# Patient Record
Sex: FEMALE | Race: WHITE | Employment: OTHER | URBAN - METROPOLITAN AREA
[De-identification: names, ages, dates, MRNs, and addresses within clinical notes are randomized per-mention and may not be internally consistent; named-entity substitution may affect disease eponyms.]

---

## 2017-01-03 ENCOUNTER — ONCOLOGY VISIT (OUTPATIENT)
Dept: ONCOLOGY | Facility: CLINIC | Age: 72
End: 2017-01-03
Attending: INTERNAL MEDICINE
Payer: MEDICARE

## 2017-01-03 ENCOUNTER — APPOINTMENT (OUTPATIENT)
Dept: LAB | Facility: CLINIC | Age: 72
End: 2017-01-03
Attending: INTERNAL MEDICINE
Payer: MEDICARE

## 2017-01-03 VITALS
HEART RATE: 61 BPM | OXYGEN SATURATION: 98 % | BODY MASS INDEX: 27.1 KG/M2 | WEIGHT: 159.9 LBS | RESPIRATION RATE: 20 BRPM | DIASTOLIC BLOOD PRESSURE: 59 MMHG | SYSTOLIC BLOOD PRESSURE: 106 MMHG | TEMPERATURE: 97.4 F

## 2017-01-03 DIAGNOSIS — Z85.3 PERSONAL HISTORY OF MALIGNANT NEOPLASM OF BREAST: Primary | ICD-10-CM

## 2017-01-03 DIAGNOSIS — M88.9 PAGET'S BONE DISEASE: ICD-10-CM

## 2017-01-03 DIAGNOSIS — M89.9 BONE LESION: ICD-10-CM

## 2017-01-03 DIAGNOSIS — M88.9 PAGET DISEASE OF BONE: ICD-10-CM

## 2017-01-03 LAB
ALBUMIN SERPL-MCNC: 3.9 G/DL (ref 3.4–5)
ALP SERPL-CCNC: 184 U/L (ref 40–150)
ALT SERPL W P-5'-P-CCNC: 24 U/L (ref 0–50)
ANION GAP SERPL CALCULATED.3IONS-SCNC: 6 MMOL/L (ref 3–14)
AST SERPL W P-5'-P-CCNC: 20 U/L (ref 0–45)
BASOPHILS # BLD AUTO: 0 10E9/L (ref 0–0.2)
BASOPHILS NFR BLD AUTO: 0.4 %
BILIRUB SERPL-MCNC: 0.6 MG/DL (ref 0.2–1.3)
BUN SERPL-MCNC: 10 MG/DL (ref 7–30)
CALCIUM SERPL-MCNC: 9 MG/DL (ref 8.5–10.1)
CHLORIDE SERPL-SCNC: 108 MMOL/L (ref 94–109)
CO2 SERPL-SCNC: 26 MMOL/L (ref 20–32)
CREAT SERPL-MCNC: 0.71 MG/DL (ref 0.52–1.04)
DIFFERENTIAL METHOD BLD: NORMAL
EOSINOPHIL # BLD AUTO: 0.2 10E9/L (ref 0–0.7)
EOSINOPHIL NFR BLD AUTO: 3.6 %
ERYTHROCYTE [DISTWIDTH] IN BLOOD BY AUTOMATED COUNT: 12.6 % (ref 10–15)
GFR SERPL CREATININE-BSD FRML MDRD: 81 ML/MIN/1.7M2
GLUCOSE SERPL-MCNC: 86 MG/DL (ref 70–99)
HCT VFR BLD AUTO: 41.6 % (ref 35–47)
HGB BLD-MCNC: 14 G/DL (ref 11.7–15.7)
IMM GRANULOCYTES # BLD: 0 10E9/L (ref 0–0.4)
IMM GRANULOCYTES NFR BLD: 0.2 %
LYMPHOCYTES # BLD AUTO: 1.6 10E9/L (ref 0.8–5.3)
LYMPHOCYTES NFR BLD AUTO: 31.3 %
MCH RBC QN AUTO: 29.7 PG (ref 26.5–33)
MCHC RBC AUTO-ENTMCNC: 33.7 G/DL (ref 31.5–36.5)
MCV RBC AUTO: 88 FL (ref 78–100)
MONOCYTES # BLD AUTO: 0.4 10E9/L (ref 0–1.3)
MONOCYTES NFR BLD AUTO: 7.6 %
NEUTROPHILS # BLD AUTO: 2.8 10E9/L (ref 1.6–8.3)
NEUTROPHILS NFR BLD AUTO: 56.9 %
NRBC # BLD AUTO: 0 10*3/UL
NRBC BLD AUTO-RTO: 0 /100
PLATELET # BLD AUTO: 183 10E9/L (ref 150–450)
POTASSIUM SERPL-SCNC: 4 MMOL/L (ref 3.4–5.3)
PROT SERPL-MCNC: 7.5 G/DL (ref 6.8–8.8)
RBC # BLD AUTO: 4.71 10E12/L (ref 3.8–5.2)
SODIUM SERPL-SCNC: 141 MMOL/L (ref 133–144)
WBC # BLD AUTO: 5 10E9/L (ref 4–11)

## 2017-01-03 PROCEDURE — 84165 PROTEIN E-PHORESIS SERUM: CPT | Performed by: INTERNAL MEDICINE

## 2017-01-03 PROCEDURE — 99205 OFFICE O/P NEW HI 60 MIN: CPT | Mod: ZP | Performed by: INTERNAL MEDICINE

## 2017-01-03 PROCEDURE — 36415 COLL VENOUS BLD VENIPUNCTURE: CPT

## 2017-01-03 PROCEDURE — 99212 OFFICE O/P EST SF 10 MIN: CPT | Mod: ZF

## 2017-01-03 PROCEDURE — 80053 COMPREHEN METABOLIC PANEL: CPT | Performed by: INTERNAL MEDICINE

## 2017-01-03 PROCEDURE — 82542 COL CHROMOTOGRAPHY QUAL/QUAN: CPT | Performed by: INTERNAL MEDICINE

## 2017-01-03 PROCEDURE — 00000402 ZZHCL STATISTIC TOTAL PROTEIN: Performed by: INTERNAL MEDICINE

## 2017-01-03 PROCEDURE — 85025 COMPLETE CBC W/AUTO DIFF WBC: CPT | Performed by: INTERNAL MEDICINE

## 2017-01-03 ASSESSMENT — PAIN SCALES - GENERAL: PAINLEVEL: NO PAIN (0)

## 2017-01-03 NOTE — NURSING NOTE
"Jenny Cruz is a 71 year old female who presents for:  Chief Complaint   Patient presents with     Oncology Clinic Visit     Hx of Breast Cancer, Bone Lesions        Initial Vitals:  /59 mmHg  Pulse 61  Temp(Src) 97.4  F (36.3  C) (Oral)  Resp 20  Wt 72.53 kg (159 lb 14.4 oz)  SpO2 98% Estimated body mass index is 27.1 kg/(m^2) as calculated from the following:    Height as of 12/12/16: 1.636 m (5' 4.41\").    Weight as of this encounter: 72.53 kg (159 lb 14.4 oz).. There is no height on file to calculate BSA. BP completed using cuff size: regular  No Pain (0) No LMP recorded. Patient is postmenopausal. Allergies and medications reviewed.     Medications: Medication refills not needed today.  Pharmacy name entered into [employer]: [facility] [address] [place] [address] AT [address] OF  [address]    Comments: Patient is not having any pain today.     6 minutes for nursing intake (face to face time)   Mervat Jain CMA         "

## 2017-01-03 NOTE — NURSING NOTE
Chief Complaint   Patient presents with     Oncology Clinic Visit     Hx of Breast Cancer, Bone Lesions     Blood Draw     Pt with hx of breast ca labs collected via venipuncture.

## 2017-01-03 NOTE — Clinical Note
1/3/2017       RE: Jenny Cruz   [facility] [address]     Dear Colleague,    Thank you for referring your patient, Jenny Cruz, to the [facility]. Please see a copy of my visit note below.    January 3, 2017       Jong Wray MD    Department of Medicine   [facility]   [address] [place]  [address]       RE:  Jenny Cruz       Dear Dr. Wray:       Thank you for referring Ms. Jenny Cruz to our clinic for diagnostic approaches to her bone abnormalities which were noted after an elevation of alkaline phosphatase was detected.  She also had some problems with significant pain in her distal third of her left tibia.  She did have an MRI in  showing a stress reaction of the left tibia.  She had a bone scan that lit up mildly over the stress reaction in the tibia and also in the sacroiliac joint.  She was referred for evaluation in the [facility].  She did have a chest CT at that time which showed some thyroid nodules but no evidence of metastatic cancer.       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  This finding was on a screening mammogram.  She had her treatment for the ductal carcinoma in situ performed at the [facility].  She had breast-conserving surgery followed by radiation.  We do not have the pathology, and she did not receive any adjuvant hormonal therapy presumably because this was before the publication showing efficacy of adjuvant tamoxifen in patients with estrogen receptor-positive DCIS.       PAST MENSTRUAL HISTORY:  In terms of past menstrual history, she is  4, para 3 with one miscarriage.  Age at first pregnancy was 27.  She did not nurse.  Menarche was at age 12.  Last menstrual period was at age 58.  She had previously used hormonal birth control for 1 or 2 years before .  She has had no hormone therapy, fertility medication or FAN exposure.   She has no history of breast pain.  Age at last pregnancy was 31.       PAST MEDICAL HISTORY:  Past medical history is remarkable for the breast surgery as noted for the DCIS.  She has no history of heart problems, heart attack, breathing problems, blood clots, seizures, arthritis, peptic ulcer disease, osteoporosis, or bone fractures.  She is not currently participating in a clinical trial.       REVIEW OF SYSTEMS:  On review of systems, she has no pain, no fatigue, no depression, and no anxiety. She has no weight loss.  She has an ECOG 0 performance status.  She performs all of her household chores.   She denies fever, headache, cough, chest pain, shortness of breath, hemoptysis, loss of appetite, nausea, vomiting, abdominal pain, constipation diarrhea, bone pain, back pain, muscle or joint complaints, numbness or tingling in the hands or feet, hearing loss, or depression.  The remainder of a 12-point review of systems is negative.     FAMILY HISTORY:  There is no history of breast cancer in first- or second-degree relatives.  There is no family history of ovarian cancer.  There is no family history of male breast cancer.  Her daughter had a sarcoma of her leg at age 29, received chemotherapy and other treatment and had no recurrence.  She is 41 and doing well.      HABITS:  She has no history of tobacco or significant consumption of alcoholic beverages.      ALLERGIES:  She has no allergy to seafood, iodine or contrast dye.  She does not take aspirin.      SOCIAL:  She is .  She works as a drug and alcohol counselor.       PHYSICAL EXAM:    Vital Signs:  Blood pressure 106/59, temperature 97.4, pulse 61, respirations 20, O2 sat 98% on room air and weight 72 kg.   General:  Ms. Cruz appeared generally well.    HEENT:  She has no alopecia.  Examination of the oropharynx is without lesions.    Lymph:  There is no palpable cervical, supraclavicular, subclavicular or axillary lymphadenopathy.    Breasts:   Examination of the right breast reveals a well-healed incision at the 1 o'clock position    3 fingerbreadths above the nipple-areolar complex.  This is well-healed without erythema or masses.  There are no masses in the right breast.  Right axillary incision is well-healed without erythema or masses.  The left breast is without masses.   Lungs:  Clear to percussion and auscultation.   Heart:  There is a regular rate and rhythm, S1, S2.    Abdomen:  Soft, nontender, without hepatosplenomegaly.   Extremities:  Without edema.    Psychiatric:  Mood and affect were normal.       LABORATORY DATA: CBC and CMP were within normal limits except for an elevated alkaline phosphatase of 184.  She did have a screening mammogram performed on 12/23/2016 which was    BI-RADS 2, scattered fibroglandular densities, and breast-conserving changes on the right.  She did also have a renal ultrasound which showed a 1.6-cm echogenic focus in the inferior pole of the left kidney which was compatible with angiomyolipoma, not significantly changed compared to prior exams, and a 1-cm, mildly complex cyst in the superior aspect of the right kidney, not significantly changed compared with ultrasound from 10/12/2015.       ASSESSMENT AND PLAN:  1.  I discussed with Ms. Cruz that the question we are addressing today is whether she could potentially have metastatic breast cancer.  This is in light of a prior history of ductal carcinoma in situ and the discovery of diffuse bone abnormalities throughout the pelvis.  There is sclerosis of the L5 vertebral body, sacrum and ilium correlating with increased uptake on a bone scan seen on 04/29/2016.  There is mild sclerosis throughout the visualized spine, more prominently in the lower lumbar spine at L2, L3 and L4 which could represent degenerative changes.  There is likely multilevel neural foraminal narrowing of the lumbar spine, and degenerative changes are seen throughout the remainder of the  visualized spine with some mild thoracolumbar scoliosis.  She also has had a bone scan performed on 04/27/2016 which shows unchanged uptake in the sacrum and right ilium, possible tiny residual focus of uptake in the proximal left femur in correlation with the previous studies, and laboratory analysis that was felt to be most consistent with Paget's disease.   2.  Discussion of differential diagnosis. Paget's disease most likely based on the imaging. labs and history.   I discussed with Ms. Cruz that it is very unlikely that she could have metastatic breast cancer, and I discussed with her that DCIS is not a form of invasive cancer and is not a cancer at all.  I discussed that, nonetheless, there are rare cases where occult breast primary cancers can result in distant metastatic disease.  I discussed that a simple way to address this would be to perform bilateral bone marrow biopsies and we could assess for cytokeratin-positive cells.  I think that if the bilateral bone marrow biopsies are negative, the process in the bones appears fairly diffuse and it would be very unlikely that she has metastatic breast cancer.    3.  Review of pathology of the right breast DCIS.  I did request that the slides be sent from [facility] for evaluation.    4.  I discussed the rationale, risks and benefits of the bone marrow biopsy.  All of her questions were answered.  5.  Followup will be in 2 weeks.  All of her questions were answered.       Thank you, Dr. Wray, for referring Jenny Cruz to our clinic and allowing us to participate in her care.       Jong Vargas MD,    [facility]   ADDENDUM:  SPEP normal.   I spent 60 minutes with the patient more than 50% of which was in counseling and coordination of care.

## 2017-01-03 NOTE — MR AVS SNAPSHOT
After Visit Summary   1/3/2017    Jenny Cruz    MRN: [id number]           Patient Information     Date Of Birth          1945        Visit Information        Provider Department      1/3/2017 8:30 AM Jong Vargas MD [facility]        Today's Diagnoses     Personal history of malignant neoplasm of breast    -  1     Paget's bone disease         Bone lesion            Follow-ups after your visit        Follow-up notes from your care team     Return in about 2 weeks (around 1/17/2017).      Your next 10 appointments already scheduled     Jan 03, 2017 10:15 AM   [facility] Draw with  [facility]   [facility] Lab Draw ([facility])    [address] [place] [id number] [phone]            Jan 11, 2017  9:40 AM   (Arrive by 9:25 AM)   BONE MARROW BIOPSY with Kiersten Spencer PA-C, [facility] BONE MARROW BIOPSY   [facility] ([facility])    [address] [place] [id number] [phone]           You may eat and drink prior to the procedure. You will have labs drawn prior to the procedure. You will be awake for the procedure. You will need a  to take you home. Please talk to your doctor about when to stop taking blood thinning medications. Please call our triage nurses with any questions that you may have at [phone].            Jan 17, 2017  9:00 AM   (Arrive by 8:45 AM)   Return Visit with Jong Vargas MD   [facility] ([facility])    [address] [place] [address] [id number] [phone]            Apr 21, 2017  9:20 AM   (Arrive by 8:50 AM)   RETURN ENDOCRINE with Raven Whipple MD   [facility])    [address] [place] [id number] [phone]              Future  "tests that were ordered for you today     Open Standing Orders        Priority Remaining Interval Expires Ordered    CBC with platelets differential Routine /52  1/3/2018 1/3/2017    Comprehensive metabolic panel Routine 52/52  1/3/2018 1/3/2017          Open Future Orders        Priority Expected Expires Ordered    Protein electrophoresis Routine  1/3/2018 1/3/2017            Who to contact     If you have questions or need follow up information about today's clinic visit or your schedule please contact [facility] directly at [phone].  Normal or non-critical lab and imaging results will be communicated to you by [employer], letter or phone within 4 business days after the clinic has received the results. If you do not hear from us within 7 days, please contact the clinic through [facility] [employer] or phone. If you have a critical or abnormal lab result, we will notify you by phone as soon as possible.  Submit refill requests through dotloop or call your pharmacy and they will forward the refill request to us. Please allow 3 business days for your refill to be completed.          Additional Information About Your Visit        [employer] Information     dotloop lets you send messages to your doctor, view your test results, renew your prescriptions, schedule appointments and more. To sign up, go to [email or web address] . Click on \"Log in\" on the left side of the screen, which will take you to the Welcome page. Then click on \"Sign up Now\" on the right side of the page.     You will be asked to enter the access code listed below, as well as some personal information. Please follow the directions to create your username and password.     Your access code is: [email or web address]  Expires: 3/5/2017  6:31 AM     Your access code will  in 90 days. If you need help or a new code, please call your [facility] or [phone].        Care EveryWhere ID     This is your Care EveryWhere ID. This could be used by " other organizations to access your [place] medical records  [id number]        Your Vitals Were     Pulse Temperature Respirations Pulse Oximetry          61 97.4  F (36.3  C) (Oral) 20 98%         Blood Pressure from Last 3 Encounters:   01/03/17 106/59   12/20/16 135/80   09/06/16 134/90    Weight from Last 3 Encounters:   01/03/17 72.53 kg (159 lb 14.4 oz)   12/20/16 71.895 kg (158 lb 8 oz)   12/12/16 71.94 kg (158 lb 9.6 oz)              We Performed the Following     PTH  Intact ([facility])     PTH Related Peptide Test        Primary Care Provider Office Phone # Fax #    Jong Wray [phone]       [address] [address]        Thank you!     Thank you for choosing [facility]  for your care. Our goal is always to provide you with excellent care. Hearing back from our patients is one way we can continue to improve our services. Please take a few minutes to complete the written survey that you may receive in the mail after your visit with us. Thank you!             Your Updated Medication List - Protect others around you: Learn how to safely use, store and throw away your medicines at www.disposemymeds.org.          This list is accurate as of: 1/3/17 10:07 AM.  Always use your most recent med list.                   Brand Name Dispense Instructions for use    CALCIUM-VITAMIN D PO          VITAMIN B COMPLEX PO          VITAMIN E COMPLEX PO

## 2017-01-04 LAB
ALBUMIN SERPL ELPH-MCNC: 4.3 G/DL (ref 3.7–5.1)
ALPHA1 GLOB SERPL ELPH-MCNC: 0.3 G/DL (ref 0.2–0.4)
ALPHA2 GLOB SERPL ELPH-MCNC: 0.7 G/DL (ref 0.5–0.9)
B-GLOBULIN SERPL ELPH-MCNC: 0.9 G/DL (ref 0.6–1)
GAMMA GLOB SERPL ELPH-MCNC: 1.1 G/DL (ref 0.7–1.6)
M PROTEIN SERPL ELPH-MCNC: 0 G/DL
PROT PATTERN SERPL ELPH-IMP: NORMAL

## 2017-01-04 NOTE — PROGRESS NOTES
January 3, 2017       Jong Wray MD    Department of Medicine   [facility]   [address] [place]  [address]       RE:  Jenny Cruz       Dear Dr. Wray:       Thank you for referring Ms. Jenny Cruz to our clinic for diagnostic approaches to her bone abnormalities which were noted after an elevation of alkaline phosphatase was detected.  She also had some problems with significant pain in her distal third of her left tibia.  She did have an MRI in  showing a stress reaction of the left tibia.  She had a bone scan that lit up mildly over the stress reaction in the tibia and also in the sacroiliac joint.  She was referred for evaluation in the [facility].  She did have a chest CT at that time which showed some thyroid nodules but no evidence of metastatic cancer.       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  This finding was on a screening mammogram.  She had her treatment for the ductal carcinoma in situ performed at the [facility].  She had breast-conserving surgery followed by radiation.  We do not have the pathology, and she did not receive any adjuvant hormonal therapy presumably because this was before the publication showing efficacy of adjuvant tamoxifen in patients with estrogen receptor-positive DCIS.       PAST MENSTRUAL HISTORY:  In terms of past menstrual history, she is  4, para 3 with one miscarriage.  Age at first pregnancy was 27.  She did not nurse.  Menarche was at age 12.  Last menstrual period was at age 58.  She had previously used hormonal birth control for 1 or 2 years before .  She has had no hormone therapy, fertility medication or FAN exposure.  She has no history of breast pain.  Age at last pregnancy was 31.       PAST MEDICAL HISTORY:  Past medical history is remarkable for the breast surgery as noted for the DCIS.  She has no history of heart problems, heart attack,  breathing problems, blood clots, seizures, arthritis, peptic ulcer disease, osteoporosis, or bone fractures.  She is not currently participating in a clinical trial.       REVIEW OF SYSTEMS:  On review of systems, she has no pain, no fatigue, no depression, and no anxiety. She has no weight loss.  She has an ECOG 0 performance status.  She performs all of her household chores.   She denies fever, headache, cough, chest pain, shortness of breath, hemoptysis, loss of appetite, nausea, vomiting, abdominal pain, constipation diarrhea, bone pain, back pain, muscle or joint complaints, numbness or tingling in the hands or feet, hearing loss, or depression.  The remainder of a 12-point review of systems is negative.     FAMILY HISTORY:  There is no history of breast cancer in first- or second-degree relatives.  There is no family history of ovarian cancer.  There is no family history of male breast cancer.  Her daughter had a sarcoma of her leg at age 29, received chemotherapy and other treatment and had no recurrence.  She is 41 and doing well.      HABITS:  She has no history of tobacco or significant consumption of alcoholic beverages.      ALLERGIES:  She has no allergy to seafood, iodine or contrast dye.  She does not take aspirin.      SOCIAL:  She is .  She works as a drug and alcohol counselor.       PHYSICAL EXAM:    Vital Signs:  Blood pressure 106/59, temperature 97.4, pulse 61, respirations 20, O2 sat 98% on room air and weight 72 kg.   General:  Ms. Cruz appeared generally well.    HEENT:  She has no alopecia.  Examination of the oropharynx is without lesions.    Lymph:  There is no palpable cervical, supraclavicular, subclavicular or axillary lymphadenopathy.    Breasts:  Examination of the right breast reveals a well-healed incision at the 1 o'clock position    3 fingerbreadths above the nipple-areolar complex.  This is well-healed without erythema or masses.  There are no masses in the right  breast.  Right axillary incision is well-healed without erythema or masses.  The left breast is without masses.   Lungs:  Clear to percussion and auscultation.   Heart:  There is a regular rate and rhythm, S1, S2.    Abdomen:  Soft, nontender, without hepatosplenomegaly.   Extremities:  Without edema.    Psychiatric:  Mood and affect were normal.       LABORATORY DATA: CBC and CMP were within normal limits except for an elevated alkaline phosphatase of 184.  She did have a screening mammogram performed on 12/23/2016 which was    BI-RADS 2, scattered fibroglandular densities, and breast-conserving changes on the right.  She did also have a renal ultrasound which showed a 1.6-cm echogenic focus in the inferior pole of the left kidney which was compatible with angiomyolipoma, not significantly changed compared to prior exams, and a 1-cm, mildly complex cyst in the superior aspect of the right kidney, not significantly changed compared with ultrasound from 10/12/2015.       ASSESSMENT AND PLAN:  1.  I discussed with Ms. Cruz that the question we are addressing today is whether she could potentially have metastatic breast cancer.  This is in light of a prior history of ductal carcinoma in situ and the discovery of diffuse bone abnormalities throughout the pelvis.  There is sclerosis of the L5 vertebral body, sacrum and ilium correlating with increased uptake on a bone scan seen on 04/29/2016.  There is mild sclerosis throughout the visualized spine, more prominently in the lower lumbar spine at L2, L3 and L4 which could represent degenerative changes.  There is likely multilevel neural foraminal narrowing of the lumbar spine, and degenerative changes are seen throughout the remainder of the visualized spine with some mild thoracolumbar scoliosis.  She also has had a bone scan performed on 04/27/2016 which shows unchanged uptake in the sacrum and right ilium, possible tiny residual focus of uptake in the proximal left  femur in correlation with the previous studies, and laboratory analysis that was felt to be most consistent with Paget's disease.   2.  Discussion of differential diagnosis. Paget's disease most likely based on the imaging. labs and history.   I discussed with Ms. Shen that it is very unlikely that she could have metastatic breast cancer, and I discussed with her that DCIS is not a form of invasive cancer and is not a cancer at all.  I discussed that, nonetheless, there are rare cases where occult breast primary cancers can result in distant metastatic disease.  I discussed that a simple way to address this would be to perform bilateral bone marrow biopsies and we could assess for cytokeratin-positive cells.  I think that if the bilateral bone marrow biopsies are negative, the process in the bones appears fairly diffuse and it would be very unlikely that she has metastatic breast cancer.    3.  Review of pathology of the right breast DCIS.  I did request that the slides be sent from [facility] for evaluation.    4.  I discussed the rationale, risks and benefits of the bone marrow biopsy.  All of her questions were answered.  5.  Followup will be in 2 weeks.  All of her questions were answered.       Thank you, Dr. Wray, for referring Kiki Shen to our clinic and allowing us to participate in her care.       Miguelangel Vargas MD,    [facility]   ADDENDUM:  SPEP normal.   ADDENDUM2:  Bone marrow shows no evidence of breast cancer and is consistent with Paget disease. Patient was called by Shannon Gill on 1-13.    ADDENDUM3:  Review of [facility] pathology reported 4-19-17 confirms that DCIS is ER and SC negative.    Name: KIKI SHEN   MR#: [id number]   Specimen #: [id number]   Collected: 1/11/2017   Received: 1/11/2017   Reported: 1/12/2017 17:26   Ordering Phy(s): MIGUELANGEL VARGAS   Additional Phy(s): KAYODE TIRADO     For improved result  formatting, select 'View Enhanced Report Format'   under Linked Documents section.     TEST(S):   A: Right Bone Marrow Biopsy/Aspiration   B: Left Bone Marrow Biopsy/Aspiration     FINAL DIAGNOSIS:   Bone marrow, posterior iliac crest, bilateral decalcified trephine   biopsy and touch imprint; bilateral direct aspirate smear, and   concentrated aspirate smear; left particle crush; and peripheral blood   smear:       - Normocellular marrow (20-30%) with trilineage hematopoiesis     - No evidence of metastatic carcinoma     - Bone changes consistent with Paget's disease     - Peripheral blood showing normal hemogram and differential     COMMENT:   There are rare lymphoid aggregates present, consistent with reactive   lymphoid aggregates based on morphology and immunophenotype.     Iron stores appear decreased based on iron stain. Clinical correlation   is recommended and correlation with peripheral blood iron   studies/ferritin may be indicated.     Flow cytometry ([id number]) identified polytypic B cells and no aberrant   immunophenotype on T cells.     I have personally reviewed all specimens and/or slides, including the   listed special stains, and used them with my medical judgment to   determine the final diagnosis.     Electronically signed out by:     Ese Celestin M.D., [facility]     Technical testing/processing performed at [place], [place]     CLINICAL HISTORY:   From [facility] electronic medial record; 71 year old female with a history of   ductal carcinoma in situ (2006). Bone abnormalities seen on imaging in   2013 and recently (not significantly changed). This bone marrow biopsy   is performed to rule out metastatic breast cancer.     REPORT:   Procedure/Gross Description   Aspirate(s) and trephine(s) procured by CATHERINE Posadas     Specimen sent for Special Studies:        Flow Cytometry: Bilateral aspirate        Cytogenetics: Bilateral  aspirate        Molecular Diagnostics: Left aspirate     Biopsy aspiration site: [@left posterior iliac crest, right posterior   iliac crest, bilateral aspirate, sternal aspirate, aspirate only]               RIGHT    LEFT     (Reference Range)        Amount of aspirate              2.5       3.0      mL        Fat and P.V. cell layer           trace    3.0      %     (1 - 3)        Particles                        0.0       5.0      %        Myeloid-erythroid layer          1.0       6.0      %     (5 - 8)          Clot Section: No     Trephine biopsy site: Bilateral posterior iliac crest     Designated left (B) posterior iliac crest is 1 cylinder of gritty   tissue, labeled with the patient's name and hospital number, having a   diameter of 2 mm and a length of 16 mm; entirely submitted in one   cassette; acetic zinc formalin fixed, decalcified, processed, and   stained for hematoxylin and eosin per laboratory protocol.     Designated right (A) posterior iliac crest is 1 cylinder of gritty   tissue, labeled with the patient's name and hospital number, having a   diameter of 2 mm and a length of 6 mm; entirely submitted in one   cassette; acetic zinc formalin fixed, decalcified, processed, and   stained for hematoxylin and eosin per laboratory protocol.     MICROSCOPIC DESCRIPTION:   PERIPHERAL BLOOD DATA (Date: January 11, 2017)   Patient Value (Reference Range >18 year old female)   7.7 WBC (4.0-11.0 x 10*9/L)   4.50 RBC (3.8-5.2 x 10*12/L)   13.2 HGB (11.7-15.7 g/dL)   87 MCV (78-100fL)   33.8 MCHC (31.5-36.5 g/dL)   12.7 RDW (10.0-15.0 %)   178 PLT (150-450 x 10*9/L)     PERIPHERAL BLOOD DIFFERENTIAL   (automated differential)   (Reference ranges >18 year old)     Percent   72.8 Neutrophils, segmented and bands   18.0 Lymphocytes   6.4 Monocytes   2.1 Eosinophils   0.4 Basophils   0.3 Immature granulocytes     Absolute   5.6 Neutrophils, segmented and bands (1.6 - 8.3 x 10*9/L)   1.4 Lymphocytes (0.8 - 5.3 x  10*9/L)   0.5 Monocytes (0 -1.3 x 10*9/L)   0.2 Eosinophils (0 - 0.7 x 10*9/L)   0.0 Basophils (0 - 0.2 x 10*9/L)   0.0 Immature granulocytes (0-0.4 x 10*9/L)     The red cells appear normochromic.  Poikilocytosis is minimal.   Polychromasia is not increased. Rouleaux formation is not increased. The   morphology of the platelets is normal.     Bone marrow aspirates and touch imprints of bone biopsies are reviewed.     BONE MARROW DIFFERENTIAL (500 cells on left direct smears):     Percent  Cell (reference range)   0.4 Blasts (0 - 1)   3.2 Neutrophil promyelocytes (2 - 4)   44.2 Neutrophils and precursors (54 - 63)   23.2 Erythroid precursors (18 - 24)   3.6 Monocytes (1 - 1.5)   3.2 Eosinophils (1 - 3)   0.0 Basophils (0 - 1)   19.6 Lymphocytes (8 - 12)   2.6 Plasma cells  (0 - 1.5)     Neutrophil maturation is complete. Erythroid maturation is complete.   Megakaryocytes are present.     IRON STAINS: Dacie iron stain on concentrate smears: Iron stains show 1%   sideroblasts.  No ring sideroblasts seen on scanning.     Prussian blue stain on particle crush:  Iron stores are decreased. .     TREPHINE SECTIONS:   The marrow cellularity is variable, ranging from <5% up to 50%, overall   20-30%. The cellular composition reflects the aspirate differential.   Megakaryocytes are present. There are rare small interstitial and   juxtatrabecular lymphoid aggregates identified on the left side core on   the deepest levels. There are bone changes consistent with Paget's   disease (particularly on the right sided biopsy).     IMMUNOHISTOCHEMICAL STAINS:   Immunohistochemical stains for CD3, CD20, and cytokeratin AE1/AE3 were   performed on the left sided core. Immunohistochemical stain for   cytokeratin AE1/AE3 was performed on the right sided core.     Cytokeratin AE1/AE3 is negative.     CD3 highlights T cells and CD20 highlights B cells. The lymphoid   aggregates are a mixture of CD3 and CD20 positive cells.     Note:  These  immunohistochemical stains are deemed medically necessary.   Some of the antigens may also be evaluated by flow cytometry.   Concurrent evaluation by immunohistochemistry on clot and/or trephine   sections is indicated in this case in order to correlate immunophenotype   with cell morphology and determine extent of involvement, spatial   pattern, and focality of potential disease distribution.     CPT Codes:   A: 27654-QEHO.T, 23449-MRXEU, 45457-AWWN, HBM, 95331-MGZZ, 09588-TKJLZ,   77950-JSLL.T, 32819-QLCWE, 80454-RVQ   B: 50829-IMIWH.T, 46189-BVRR, 99104-HMGTMC, HBM, 83401-NOCG,   90289-NNGVK, 55260-ANEM.T, 49721-ODSGR, 16791-YRYGD, 72862-LHRDW,   54538-JEN, 01549-ZUJ, 41777-OEN     TESTING LAB LOCATION:   [facility], [address]   [address] [phone]     COLLECTION SITE:   Client:  [facility]   Location:  [facility] ()           Patient Name: KIKI SHEN   MR#: [id number]   Specimen #: [id number]   Collected: 1/10/2017   Received: 1/10/2017   Reported: 4/18/2017 20:25   Ordering Phy(s): KAYODE TIRADO   Additional Phy(s): [facility]     For improved result formatting, select 'View Enhanced Report Format'   under Linked Documents section.     TEST(S):   A: 3 Slides, case #[id number]   B: 4 Slides, case #[id number]     FINAL DIAGNOSIS:   I. CASE FROM [place], [place] ([id number], OBTAINED   07/14/2016):   Right breast, 1 o'clock, core biopsy:   - Ductal carcinoma in situ (DCIS), nuclear grade 3 ,solid type, with   lobular involvement and comedo necrosis   - Calcifications associated with DCIS   - DCIS is estrogen receptor negative (<1%, weak intensity) and   progesterone receptor negative (< 1%, moderate intensity) by   immunohistochemistry   - See comment     II. CASE FROM [place], [place] ([id number], OBTAINED    07/20/2006):   A. Lymph node, right axillary, sentinel, excision:   - One benign lymph node (0/1)   - See comment     B. Right breast, wide local excision:   - Ductal carcinoma in situ (DCIS), nuclear grade 3 ,solid and cribriform   types, with lobular involvement and comedo necrosis   - Calcifications associated with DCIS and benign acini   - See comment     COMMENT:   I. Right breast core biopsy (obtained 7/14/2006): One H&E slide,   estrogen receptor (ER) and progesterone receptor (PgR) immunostains are   provided for review.  No immunostain controls are provided for review.   The ER and PgR results are listed in the final diagnosis above.  The ER   immunostain shows staining in comedo necrosis, and cytoplasmic staining   in some of the DCIS, which are not interpreted as true positive tumor   cells.     II. Right breast wide excision and right axillary sentinel lymph node   (obtained 9/20/2006):   A. [place] lymph node: One H&E level and one keratin immunostain are   provided for review.     B. Right breast: One H&E slide (B25) is provided for review.  It shows   DCIS, which spans 7 mm.  According to the outside report, DCIS measures   1.4 x 1.0 x 0.8 cm, and the margins are uninvolved by DCIS.  The   pathologic stage is pTisN0(i-)sn.     I have personally reviewed all specimens and or slides, including the   listed special stains, and used them with my medical judgement to   determine the final diagnosis.     Electronically signed out by:     Sowmya Souza M.D., [facility]     CLINICAL HISTORY:   The patient is a 71-year-old woman.     GROSS:   Received from [facility] in [place], [place] are 3 stained slides labeled   [id number] (obtained 07/14/2006), 4 stained slides labeled [id number]   (obtained 07/20/2006), and copies of the referring pathologist's reports   with patient identifying information.  All slides are returned.     CPT Codes:   A: 40936-SAK6   B: 67528-IWA6     TESTING LAB LOCATION:    [facility]   [address]   [place]   [address]   [phone]     COLLECTION SITE:   Client:  [facility]   Location:  TAB (B)     I spent 60 minutes with the patient more than 50% of which was in counseling and coordination of care.

## 2017-01-06 ENCOUNTER — CARE COORDINATION (OUTPATIENT)
Dept: ONCOLOGY | Facility: CLINIC | Age: 72
End: 2017-01-06

## 2017-01-06 LAB — PTH RELATED PROT SERPL-SCNC: 2.6 PMOL/L

## 2017-01-06 NOTE — PROGRESS NOTES
Received by [employer] pathology slides from [facility] from right breast excision and right axillary LN done on 7/20/06 Accession #[id number] that was brought to the Pathology Department for review. Shannon Gill RN, BSN

## 2017-01-10 PROCEDURE — 00000346 ZZHCL STATISTIC REVIEW OUTSIDE SLIDES TC 88321: Performed by: PHYSICIAN ASSISTANT

## 2017-01-11 ENCOUNTER — OFFICE VISIT (OUTPATIENT)
Dept: ONCOLOGY | Facility: CLINIC | Age: 72
End: 2017-01-11
Attending: PHYSICIAN ASSISTANT
Payer: MEDICARE

## 2017-01-11 VITALS
WEIGHT: 156.75 LBS | BODY MASS INDEX: 26.56 KG/M2 | SYSTOLIC BLOOD PRESSURE: 120 MMHG | HEART RATE: 60 BPM | TEMPERATURE: 98.7 F | OXYGEN SATURATION: 94 % | RESPIRATION RATE: 16 BRPM | DIASTOLIC BLOOD PRESSURE: 73 MMHG

## 2017-01-11 DIAGNOSIS — Z85.3 PERSONAL HISTORY OF MALIGNANT NEOPLASM OF BREAST: ICD-10-CM

## 2017-01-11 DIAGNOSIS — M89.9 BONE LESION: Primary | ICD-10-CM

## 2017-01-11 LAB
ALBUMIN SERPL-MCNC: 3.6 G/DL (ref 3.4–5)
ALP SERPL-CCNC: 177 U/L (ref 40–150)
ALT SERPL W P-5'-P-CCNC: 18 U/L (ref 0–50)
ANION GAP SERPL CALCULATED.3IONS-SCNC: 8 MMOL/L (ref 3–14)
AST SERPL W P-5'-P-CCNC: 20 U/L (ref 0–45)
BASOPHILS # BLD AUTO: 0 10E9/L (ref 0–0.2)
BASOPHILS NFR BLD AUTO: 0.4 %
BILIRUB SERPL-MCNC: 0.6 MG/DL (ref 0.2–1.3)
BUN SERPL-MCNC: 15 MG/DL (ref 7–30)
CALCIUM SERPL-MCNC: 9.1 MG/DL (ref 8.5–10.1)
CHLORIDE SERPL-SCNC: 107 MMOL/L (ref 94–109)
CO2 SERPL-SCNC: 25 MMOL/L (ref 20–32)
CREAT SERPL-MCNC: 0.59 MG/DL (ref 0.52–1.04)
DIFFERENTIAL METHOD BLD: NORMAL
EOSINOPHIL # BLD AUTO: 0.2 10E9/L (ref 0–0.7)
EOSINOPHIL NFR BLD AUTO: 2.1 %
ERYTHROCYTE [DISTWIDTH] IN BLOOD BY AUTOMATED COUNT: 12.7 % (ref 10–15)
GFR SERPL CREATININE-BSD FRML MDRD: ABNORMAL ML/MIN/1.7M2
GLUCOSE SERPL-MCNC: 93 MG/DL (ref 70–99)
HCT VFR BLD AUTO: 39 % (ref 35–47)
HGB BLD-MCNC: 13.2 G/DL (ref 11.7–15.7)
IMM GRANULOCYTES # BLD: 0 10E9/L (ref 0–0.4)
IMM GRANULOCYTES NFR BLD: 0.3 %
LYMPHOCYTES # BLD AUTO: 1.4 10E9/L (ref 0.8–5.3)
LYMPHOCYTES NFR BLD AUTO: 18 %
MCH RBC QN AUTO: 29.3 PG (ref 26.5–33)
MCHC RBC AUTO-ENTMCNC: 33.8 G/DL (ref 31.5–36.5)
MCV RBC AUTO: 87 FL (ref 78–100)
MONOCYTES # BLD AUTO: 0.5 10E9/L (ref 0–1.3)
MONOCYTES NFR BLD AUTO: 6.4 %
NEUTROPHILS # BLD AUTO: 5.6 10E9/L (ref 1.6–8.3)
NEUTROPHILS NFR BLD AUTO: 72.8 %
NRBC # BLD AUTO: 0 10*3/UL
NRBC BLD AUTO-RTO: 0 /100
PLATELET # BLD AUTO: 178 10E9/L (ref 150–450)
POTASSIUM SERPL-SCNC: 4.1 MMOL/L (ref 3.4–5.3)
PROT SERPL-MCNC: 7.4 G/DL (ref 6.8–8.8)
RBC # BLD AUTO: 4.5 10E12/L (ref 3.8–5.2)
SODIUM SERPL-SCNC: 140 MMOL/L (ref 133–144)
WBC # BLD AUTO: 7.7 10E9/L (ref 4–11)

## 2017-01-11 PROCEDURE — 40000424 ZZHCL STATISTIC BONE MARROW CORE PERF TC 38221: Performed by: INTERNAL MEDICINE

## 2017-01-11 PROCEDURE — 88305 TISSUE EXAM BY PATHOLOGIST: CPT | Performed by: INTERNAL MEDICINE

## 2017-01-11 PROCEDURE — 88341 IMHCHEM/IMCYTCHM EA ADD ANTB: CPT | Performed by: INTERNAL MEDICINE

## 2017-01-11 PROCEDURE — 88311 DECALCIFY TISSUE: CPT | Mod: 91 | Performed by: INTERNAL MEDICINE

## 2017-01-11 PROCEDURE — 85025 COMPLETE CBC W/AUTO DIFF WBC: CPT | Performed by: INTERNAL MEDICINE

## 2017-01-11 PROCEDURE — 40000795 ZZHCL STATISTIC DNA PROCESS AND HOLD: Performed by: PHYSICIAN ASSISTANT

## 2017-01-11 PROCEDURE — 40000611 ZZHCL STATISTIC MORPHOLOGY W/INTERP HEMEPATH TC 85060: Performed by: INTERNAL MEDICINE

## 2017-01-11 PROCEDURE — 38221 DX BONE MARROW BIOPSIES: CPT | Mod: 50,ZF | Performed by: PHYSICIAN ASSISTANT

## 2017-01-11 PROCEDURE — 88185 FLOWCYTOMETRY/TC ADD-ON: CPT | Performed by: INTERNAL MEDICINE

## 2017-01-11 PROCEDURE — 88275 CYTOGENETICS 100-300: CPT | Performed by: PHYSICIAN ASSISTANT

## 2017-01-11 PROCEDURE — 88313 SPECIAL STAINS GROUP 2: CPT | Performed by: INTERNAL MEDICINE

## 2017-01-11 PROCEDURE — 25000125 ZZHC RX 250: Mod: ZF | Performed by: PHYSICIAN ASSISTANT

## 2017-01-11 PROCEDURE — 88161 CYTOPATH SMEAR OTHER SOURCE: CPT | Performed by: INTERNAL MEDICINE

## 2017-01-11 PROCEDURE — 88184 FLOWCYTOMETRY/ TC 1 MARKER: CPT | Performed by: INTERNAL MEDICINE

## 2017-01-11 PROCEDURE — 88237 TISSUE CULTURE BONE MARROW: CPT | Mod: 91 | Performed by: PHYSICIAN ASSISTANT

## 2017-01-11 PROCEDURE — 00000161 ZZHCL STATISTIC H-SPHEME PROCESS B/S: Performed by: INTERNAL MEDICINE

## 2017-01-11 PROCEDURE — 40000951 ZZHCL STATISTIC BONE MARROW INTERP TC 85097: Performed by: INTERNAL MEDICINE

## 2017-01-11 PROCEDURE — G0364 BONE MARROW ASPIRATE &BIOPSY: HCPCS | Mod: ZF | Performed by: PHYSICIAN ASSISTANT

## 2017-01-11 PROCEDURE — 88342 IMHCHEM/IMCYTCHM 1ST ANTB: CPT | Performed by: INTERNAL MEDICINE

## 2017-01-11 PROCEDURE — 40000425 ZZHCL STATISTIC ADDL BM COR PERF TC 38221: Performed by: INTERNAL MEDICINE

## 2017-01-11 PROCEDURE — 80053 COMPREHEN METABOLIC PANEL: CPT | Performed by: INTERNAL MEDICINE

## 2017-01-11 PROCEDURE — 00000058 ZZHCL STATISTIC BONE MARROW ASP PERF TC 38220: Performed by: INTERNAL MEDICINE

## 2017-01-11 PROCEDURE — 88271 CYTOGENETICS DNA PROBE: CPT | Performed by: PHYSICIAN ASSISTANT

## 2017-01-11 RX ADMIN — MIDAZOLAM 0.5 MG: 1 INJECTION INTRAMUSCULAR; INTRAVENOUS at 10:25

## 2017-01-11 ASSESSMENT — PAIN SCALES - GENERAL: PAINLEVEL: NO PAIN (0)

## 2017-01-11 NOTE — NURSING NOTE
BMT Teaching Flowsheet   Teaching Topic: post biopsy instructions    Person(s) involved in teaching: Patient  Motivation Level  Asks Questions: Yes  Eager to Learn: Yes  Cooperative: Yes  Receptive (willing/able to accept information): Yes    Patient demonstrates understanding of the following:   - Reason for the appointment, diagnosis and treatment plan: Yes  - Knowledge of proper use of medications and conditions for which they are ordered (with special attention to potential side effects or drug interactions): Yes  - Which situations necessitate calling provider and whom to contact: Yes    Teaching concerns addressed: reviewed activity restrictions if received premeds, potential for bleeding and actions to take if develops any of the issues below    Proper use and care of (medical equipment, care aids, etc.) Yes  Pain management techniques: Yes  Patient instructed on hand hygiene: Yes  How and/when to access community resources: Yes    Infection Control:  Patient demonstrates understanding of the following:   Surgical procedure site care taught NA  Signs and symptoms of infection taught Yes  Wound care taught Yes  Central venous catheter care taught NA    Instructional Materials Used/Given: verbal, print out of post biopsy instructions.     Time spent with patient: 5 minutes.    Specific Concerns: NA

## 2017-01-11 NOTE — Clinical Note
1/11/2017       RE: Jenny Cruz  [address]  [place] [address]     Dear Colleague,    Thank you for referring your patient, Jenny Cruz, to the [facility]. Please see a copy of my visit note below.    No notes on file    Again, thank you for allowing me to participate in the care of your patient.      Sincerely,    Kiersten Spencer PA-C

## 2017-01-11 NOTE — NURSING NOTE
Drug Administration Record    Drug Name: Versed  Dose: 0.5mg  Route administered: IV  NDC #: [id number]  Amount of waste(mL):1.5ml  Reason for waste: Single use vial

## 2017-01-11 NOTE — NURSING NOTE
Patient monitored in supine position for 30 minutes following biopsy. Patients vitals are okay and dressing is dry and intact. Patient discharged ambulatory from clinic in the care of a family member.  NELIDA FAROOQ CMA

## 2017-01-12 LAB
COPATH REPORT: NORMAL
COPATH REPORT: NORMAL

## 2017-01-12 NOTE — PROGRESS NOTES
BMT ONC Adult Bone Marrow Biopsy Procedure Note  January 11, 2017  /73 mmHg  Pulse 60  Temp(Src) 98.7  F (37.1  C)  Resp 16  Wt 71.1 kg (156 lb 12 oz)  SpO2 94%     Learning needs assessment complete within 12 months?     DIAGNOSIS: Bone lesions     PROCEDURE: Bilateral Bone Marrow Biopsy and Bilateral Aspirate    LOCATION: [facility] 2nd Floor    Patient s identification was positively verified by verbal identification and invasive procedure safety checklist was completed. Informed consent was obtained. Following the administration of 0.5mg Midazolam as pre-medication, patient was placed in the prone position and prepped and draped in a sterile manner. Approximately 16 cc of 1% Lidocaine was used over the bilateral posterior iliac spine. Following this a 3 mm incision was made. Trephine bone marrow core(s) was (were) obtained from the [facility]. Bone marrow aspirates were obtained from the [facility]. Aspirates were sent for morphology, immunophenotyping, cytogenetics and molecular diagnostics hold-molecular drawn only from the L. A total of approximately 30 ml of marrow was aspirated. Following this procedure a sterile dressing was applied to the bone marrow biopsy site(s). The patient was placed in the supine position to maintain pressure on the biopsy site. Post-procedure wound care instructions were given.     Complications: None-pt tolerated the procedure quite well.     Post-procedural pain assessment: 0 out of 10 on the numeric pain rating scale.     Interventions: None needed     Length of procedure:21 minutes to 45 minutes    Procedure performed by: Kiersten Spencer PA-C

## 2017-01-13 ENCOUNTER — CARE COORDINATION (OUTPATIENT)
Dept: ONCOLOGY | Facility: CLINIC | Age: 72
End: 2017-01-13

## 2017-01-13 NOTE — PROGRESS NOTES
Spoke to patient to re-iterate Dr Vargas calling with BM results and reviewed lab results. Spoke to patient to get signed up on [employer] and gave temporary password to get signed into MyChart. Reviewed use of [employer] usage for questions to [facility] staff, view released results and when to use [employer] and to call the nurse triage with any issues. Mailed [employer] letter with temporary password to patient's home.  Answered all patient's questions and verbalized understanding. Shannon Gill RN, BSN.

## 2017-01-15 ENCOUNTER — TELEPHONE (OUTPATIENT)
Dept: INTERNAL MEDICINE | Facility: CLINIC | Age: 72
End: 2017-01-15

## 2017-01-15 NOTE — PROGRESS NOTES
January 3, 2017         Jong Wray MD     Department of Medicine    [facility]    [address] [place]  [address]         RE:  Jenny Cruz         Dear Dr. Wray:         Thank you for referring Ms. Jenny Cruz to our clinic for diagnostic approaches to her bone abnormalities which were noted after an elevation of alkaline phosphatase was detected.  She also had some problems with significant pain in her distal third of her left tibia.  She did have an MRI in  showing a stress reaction of the left tibia.  She had a bone scan that lit up mildly over the stress reaction in the tibia and also in the sacroiliac joint.  She was referred for evaluation in the [facility].  She did have a chest CT at that time which showed some thyroid nodules but no evidence of metastatic cancer.         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  This finding was on a screening mammogram.  She had her treatment for the ductal carcinoma in situ performed at the [facility].  She had breast-conserving surgery followed by radiation.  We do not have the pathology, and she did not receive any adjuvant hormonal therapy presumably because this was before the publication showing efficacy of adjuvant tamoxifen in patients with estrogen receptor-positive DCIS.         PAST MENSTRUAL HISTORY:  In terms of past menstrual history, she is  4, para 3 with one miscarriage.  Age at first pregnancy was 27.  She did not nurse.  Menarche was at age 12.  Last menstrual period was at age 58.  She had previously used hormonal birth control for 1 or 2 years before .  She has had no hormone therapy, fertility medication or FAN exposure.  She has no history of breast pain.  Age at last pregnancy was 31.         PAST MEDICAL HISTORY:  Past medical history is remarkable for the breast surgery as noted for the DCIS.  She has no history of heart problems,  "heart attack, breathing problems, blood clots, seizures, arthritis, peptic ulcer disease, osteoporosis, or bone fractures.  She is not currently participating in a clinical trial.        REVIEW OF SYSTEMS:  On review of systems, she has no pain, no fatigue, no depression, and no anxiety. She has no weight loss.  She has an ECOG 0 performance status.  She performs all of her household chores.   She denies fever, headache, cough, chest pain, shortness of breath, hemoptysis, loss of appetite, nausea, vomiting, abdominal pain, constipation diarrhea, bone pain, back pain, muscle or joint complaints, numbness or tingling in the hands or feet, hearing loss, or depression.  The remainder of a 12-point review of systems is negative.     FAMILY HISTORY:  There is no history of breast cancer in first- or second-degree relatives.  There is no family history of ovarian cancer.  There is no family history of male breast cancer.  Her daughter had a sarcoma of her leg at age 29, received chemotherapy and other treatment and had no recurrence.  She is 41 and doing well.       HABITS:  She has no history of tobacco or significant consumption of alcoholic beverages.        ALLERGIES:  She has no allergy to seafood, iodine or contrast dye.  She does not take aspirin.      SOCIAL:  She is .  She works as a drug and alcohol counselor.      PHYSICAL EXAM:  /69 mmHg  Pulse 55  Temp(Src) 98  F (36.7  C) (Oral)  Resp 12  Ht 1.65 m (5' 4.96\")  Wt 72.893 kg (160 lb 11.2 oz)  BMI 26.77 kg/m2  SpO2 97%  Exam was already performed this month; no new complaints.    SCREENING MAMMOGRAM, BILATERAL, DIGITAL, with CAD and TOMOSYNTHESIS     HISTORY: No current breast complaints. Right-sided breast cancer 2006.     COMPARISON: 4/30/2015, 5/1/2013, 5/4/2012, 4/5/2011, 7/14/2006     BREAST DENSITY: Scattered fibroglandular densities.     Post breast conserving therapy findings on the right. No significant  mammographic " change.                                                                       IMPRESSION: BI-RADS CATEGORY: 2 - Benign Finding(s).     RECOMMENDED FOLLOW-UP: Annual Mammography     Results to be sent to patient.     I have personally reviewed the examination and initial interpretation  and I agree with the findings.     JACQUELINE ESCUDERO MD    Patient Name: KIKI SHEN   MR#: [id number]   Specimen #: [id number]   Collected: 1/11/2017   Received: 1/11/2017   Reported: 1/12/2017 17:26   Ordering Phy(s): MIGUELANGEL COLLINS   Additional Phy(s): KAYODE TIRADO     For improved result formatting, select 'View Enhanced Report Format'   under Linked Documents section.     TEST(S):   A: Right Bone Marrow Biopsy/Aspiration   B: Left Bone Marrow Biopsy/Aspiration     FINAL DIAGNOSIS:   Bone marrow, posterior iliac crest, bilateral decalcified trephine   biopsy and touch imprint; bilateral direct aspirate smear, and   concentrated aspirate smear; left particle crush; and peripheral blood   smear:       - Normocellular marrow (20-30%) with trilineage hematopoiesis     - No evidence of metastatic carcinoma     - Bone changes consistent with Paget's disease     - Peripheral blood showing normal hemogram and differential     COMMENT:   There are rare lymphoid aggregates present, consistent with reactive   lymphoid aggregates based on morphology and immunophenotype.     Iron stores appear decreased based on iron stain. Clinical correlation   is recommended and correlation with peripheral blood iron   studies/ferritin may be indicated.     Flow cytometry ([id number]) identified polytypic B cells and no aberrant   immunophenotype on T cells.     I have personally reviewed all specimens and/or slides, including the   listed special stains, and used them with my medical judgment to   determine the final diagnosis.     Electronically signed out by:     Ese Celestin M.D., [facility]     Technical testing/processing  performed at [place], [place]     CLINICAL HISTORY:   From [facility] electronic medial record; 71 year old female with a history of   ductal carcinoma in situ (2006). Bone abnormalities seen on imaging in   2013 and recently (not significantly changed). This bone marrow biopsy   is performed to rule out metastatic breast cancer.     REPORT:   Procedure/Gross Description   Aspirate(s) and trephine(s) procured by CATHERINE Posadas     Specimen sent for Special Studies:        Flow Cytometry: Bilateral aspirate        Cytogenetics: Bilateral aspirate        Molecular Diagnostics: Left aspirate     Biopsy aspiration site: [@left posterior iliac crest, right posterior   iliac crest, bilateral aspirate, sternal aspirate, aspirate only]               RIGHT    LEFT     (Reference Range)        Amount of aspirate              2.5       3.0      mL        Fat and P.V. cell layer           trace    3.0      %     (1 - 3)        Particles                        0.0       5.0      %        Myeloid-erythroid layer          1.0       6.0      %     (5 - 8)          Clot Section: No     Trephine biopsy site: Bilateral posterior iliac crest     Designated left (B) posterior iliac crest is 1 cylinder of gritty   tissue, labeled with the patient's name and hospital number, having a   diameter of 2 mm and a length of 16 mm; entirely submitted in one   cassette; acetic zinc formalin fixed, decalcified, processed, and   stained for hematoxylin and eosin per laboratory protocol.     Designated right (A) posterior iliac crest is 1 cylinder of gritty   tissue, labeled with the patient's name and hospital number, having a   diameter of 2 mm and a length of 6 mm; entirely submitted in one   cassette; acetic zinc formalin fixed, decalcified, processed, and   stained for hematoxylin and eosin per laboratory protocol.     MICROSCOPIC DESCRIPTION:   PERIPHERAL BLOOD DATA (Date: January 11, 2017)    Patient Value (Reference Range >18 year old female)   7.7 WBC (4.0-11.0 x 10*9/L)   4.50 RBC (3.8-5.2 x 10*12/L)   13.2 HGB (11.7-15.7 g/dL)   87 MCV (78-100fL)   33.8 MCHC (31.5-36.5 g/dL)   12.7 RDW (10.0-15.0 %)   178 PLT (150-450 x 10*9/L)     PERIPHERAL BLOOD DIFFERENTIAL   (automated differential)   (Reference ranges >18 year old)     Percent   72.8 Neutrophils, segmented and bands   18.0 Lymphocytes   6.4 Monocytes   2.1 Eosinophils   0.4 Basophils   0.3 Immature granulocytes     Absolute   5.6 Neutrophils, segmented and bands (1.6 - 8.3 x 10*9/L)   1.4 Lymphocytes (0.8 - 5.3 x 10*9/L)   0.5 Monocytes (0 -1.3 x 10*9/L)   0.2 Eosinophils (0 - 0.7 x 10*9/L)   0.0 Basophils (0 - 0.2 x 10*9/L)   0.0 Immature granulocytes (0-0.4 x 10*9/L)     The red cells appear normochromic.  Poikilocytosis is minimal.   Polychromasia is not increased. Rouleaux formation is not increased. The   morphology of the platelets is normal.     Bone marrow aspirates and touch imprints of bone biopsies are reviewed.     BONE MARROW DIFFERENTIAL (500 cells on left direct smears):     Percent  Cell (reference range)   0.4 Blasts (0 - 1)   3.2 Neutrophil promyelocytes (2 - 4)   44.2 Neutrophils and precursors (54 - 63)   23.2 Erythroid precursors (18 - 24)   3.6 Monocytes (1 - 1.5)   3.2 Eosinophils (1 - 3)   0.0 Basophils (0 - 1)   19.6 Lymphocytes (8 - 12)   2.6 Plasma cells  (0 - 1.5)     Neutrophil maturation is complete. Erythroid maturation is complete.   Megakaryocytes are present.     IRON STAINS: Dacie iron stain on concentrate smears: Iron stains show 1%   sideroblasts.  No ring sideroblasts seen on scanning.     Prussian blue stain on particle crush:  Iron stores are decreased. .     TREPHINE SECTIONS:   The marrow cellularity is variable, ranging from <5% up to 50%, overall   20-30%. The cellular composition reflects the aspirate differential.   Megakaryocytes are present. There are rare small interstitial and    juxtatrabecular lymphoid aggregates identified on the left side core on   the deepest levels. There are bone changes consistent with Paget's   disease (particularly on the right sided biopsy).     IMMUNOHISTOCHEMICAL STAINS:   Immunohistochemical stains for CD3, CD20, and cytokeratin AE1/AE3 were   performed on the left sided core. Immunohistochemical stain for   cytokeratin AE1/AE3 was performed on the right sided core.     Cytokeratin AE1/AE3 is negative.     CD3 highlights T cells and CD20 highlights B cells. The lymphoid   aggregates are a mixture of CD3 and CD20 positive cells.     Note:  These immunohistochemical stains are deemed medically necessary.   Some of the antigens may also be evaluated by flow cytometry.   Concurrent evaluation by immunohistochemistry on clot and/or trephine   sections is indicated in this case in order to correlate immunophenotype   with cell morphology and determine extent of involvement, spatial   pattern, and focality of potential disease distribution.     CPT Codes:   A: 19847-FRTN.T, 23577-XVTSM, 37881-GLBK, HBM, 64202-ZLZT, 90030-OSPPO,   18698-IQSI.T, 70174-IFPDH, 22130-INP   B: 96854-TVNFK.T, 75917-VTOK, 62824-TTIOCG, HBM, 97574-FTSW,   32404-AIDEW, 28503-ABAV.T, 21053-UYKZS, 82833-NJFVA, 12152-CXXDF,   01934-TFV, 19079-WBF, 53590-QMN     TESTING LAB LOCATION:   [facility], [address]   [id number] [phone]     COLLECTION SITE:   Client:  [facility]   Location:  [facility] ([facility])     ASSESSMENT AND PLAN:    1.  No evidence of bone involvement with breast cancer or other malignancy.  Jenny returns to clinic for followup on her bone marrow biopsy.  We discussed with Jenny that we have good news and that the bone marrow biopsy shows no evidence of malignancy, including no evidence of metastatic breast cancer and no evidence of plasma cell malignancy.  These  concerns were raised because of some bone abnormalities that were observed on previous imaging.  I discussed with her that we recommend yearly mammography with the next mammogram to be performed in December and then she can follow up with Dr. Wray for breast care.  Jenny was very pleased with these results, as are we.    2.  The biopsy was consistent with Paget disease of bone and the plan would be for her to follow up with Endocrinology for treatment.  All of Jenny's questions were answered.   3. History of DCIS.  No evidence of recurrence.  Recommend yearly mammography.   4. Rare interstitial and juxtatrabecular lymphoid aggregates.  Incidental finding. Yearly CBC.   5.  Follow up with Dr. Wray.     Thank you, Dr. Wray, for allowing us to participate in Jenny Cruz's care.     Sincerely,    Jong Vargas M.D.      Division of Hematology, Oncology, Transplant   Department of Medicine   [employer]   [phone]       I spent 15 minutes with the patient more than 50% of which was in counseling and coordination of care.

## 2017-01-15 NOTE — TELEPHONE ENCOUNTER
----- Message from Jong Vargas MD sent at 1/14/2017 10:09 PM CST -----  Dr. Wray,  Bone marrow consistent with Paget's with no evidence of breast cancer.  We can see her for surveillance if she wishes.   Good news!    Jong

## 2017-01-16 LAB — COPATH REPORT: NORMAL

## 2017-01-17 ENCOUNTER — ONCOLOGY VISIT (OUTPATIENT)
Dept: ONCOLOGY | Facility: CLINIC | Age: 72
End: 2017-01-17
Attending: INTERNAL MEDICINE
Payer: MEDICARE

## 2017-01-17 VITALS
OXYGEN SATURATION: 97 % | RESPIRATION RATE: 12 BRPM | BODY MASS INDEX: 26.77 KG/M2 | DIASTOLIC BLOOD PRESSURE: 69 MMHG | WEIGHT: 160.7 LBS | TEMPERATURE: 98 F | HEART RATE: 55 BPM | HEIGHT: 65 IN | SYSTOLIC BLOOD PRESSURE: 117 MMHG

## 2017-01-17 DIAGNOSIS — D05.11 NEOPLASM OF RIGHT BREAST, PRIMARY TUMOR STAGING CATEGORY TIS: DUCTAL CARCINOMA IN SITU (DCIS): Primary | ICD-10-CM

## 2017-01-17 PROCEDURE — 99212 OFFICE O/P EST SF 10 MIN: CPT | Mod: ZF

## 2017-01-17 PROCEDURE — 99213 OFFICE O/P EST LOW 20 MIN: CPT | Mod: ZP | Performed by: INTERNAL MEDICINE

## 2017-01-17 ASSESSMENT — PAIN SCALES - GENERAL: PAINLEVEL: NO PAIN (0)

## 2017-01-17 NOTE — Clinical Note
2017       RE: Jenny Cruz   [facility] [address]     Dear Colleague,    Thank you for referring your patient, Jenny Cruz, to the [facility]. Please see a copy of my visit note below.    January 3, 2017         Jong Wray MD     Department of Medicine    [facility]    [address] [place]  [address]         RE:  Jenny Cruz         Dear Dr. Wray:         Thank you for referring Ms. Jenny Cruz to our clinic for diagnostic approaches to her bone abnormalities which were noted after an elevation of alkaline phosphatase was detected.  She also had some problems with significant pain in her distal third of her left tibia.  She did have an MRI in  showing a stress reaction of the left tibia.  She had a bone scan that lit up mildly over the stress reaction in the tibia and also in the sacroiliac joint.  She was referred for evaluation in the [facility].  She did have a chest CT at that time which showed some thyroid nodules but no evidence of metastatic cancer.         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  This finding was on a screening mammogram.  She had her treatment for the ductal carcinoma in situ performed at the [facility].  She had breast-conserving surgery followed by radiation.  We do not have the pathology, and she did not receive any adjuvant hormonal therapy presumably because this was before the publication showing efficacy of adjuvant tamoxifen in patients with estrogen receptor-positive DCIS.         PAST MENSTRUAL HISTORY:  In terms of past menstrual history, she is  4, para 3 with one miscarriage.  Age at first pregnancy was 27.  She did not nurse.  Menarche was at age 12.  Last menstrual period was at age 58.  She had previously used hormonal birth control for 1 or 2 years before .  She has had no hormone therapy, fertility medication  "or FAN exposure.  She has no history of breast pain.  Age at last pregnancy was 31.         PAST MEDICAL HISTORY:  Past medical history is remarkable for the breast surgery as noted for the DCIS.  She has no history of heart problems, heart attack, breathing problems, blood clots, seizures, arthritis, peptic ulcer disease, osteoporosis, or bone fractures.  She is not currently participating in a clinical trial.        REVIEW OF SYSTEMS:  On review of systems, she has no pain, no fatigue, no depression, and no anxiety. She has no weight loss.  She has an ECOG 0 performance status.  She performs all of her household chores.   She denies fever, headache, cough, chest pain, shortness of breath, hemoptysis, loss of appetite, nausea, vomiting, abdominal pain, constipation diarrhea, bone pain, back pain, muscle or joint complaints, numbness or tingling in the hands or feet, hearing loss, or depression.  The remainder of a 12-point review of systems is negative.     FAMILY HISTORY:  There is no history of breast cancer in first- or second-degree relatives.  There is no family history of ovarian cancer.  There is no family history of male breast cancer.  Her daughter had a sarcoma of her leg at age 29, received chemotherapy and other treatment and had no recurrence.  She is 41 and doing well.       HABITS:  She has no history of tobacco or significant consumption of alcoholic beverages.        ALLERGIES:  She has no allergy to seafood, iodine or contrast dye.  She does not take aspirin.      SOCIAL:  She is .  She works as a drug and alcohol counselor.      PHYSICAL EXAM:  /69 mmHg  Pulse 55  Temp(Src) 98  F (36.7  C) (Oral)  Resp 12  Ht 1.65 m (5' 4.96\")  Wt 72.893 kg (160 lb 11.2 oz)  BMI 26.77 kg/m2  SpO2 97%  Exam was already performed this month; no new complaints.    SCREENING MAMMOGRAM, BILATERAL, DIGITAL, with CAD and TOMOSYNTHESIS     HISTORY: No current breast complaints. Right-sided breast " cancer 2006.     COMPARISON: 4/30/2015, 5/1/2013, 5/4/2012, 4/5/2011, 7/14/2006     BREAST DENSITY: Scattered fibroglandular densities.     Post breast conserving therapy findings on the right. No significant  mammographic change.                                                                       IMPRESSION: BI-RADS CATEGORY: 2 - Benign Finding(s).     RECOMMENDED FOLLOW-UP: Annual Mammography     Results to be sent to patient.     I have personally reviewed the examination and initial interpretation  and I agree with the findings.     JACQUELINE ESCUDERO MD    Patient Name: KIKI SHEN   MR#: [id number]   Specimen #: [id number]   Collected: 1/11/2017   Received: 1/11/2017   Reported: 1/12/2017 17:26   Ordering Phy(s): MIGUELANGEL COLLINS   Additional Phy(s): KAYODE TIRADO     For improved result formatting, select 'View Enhanced Report Format'   under Linked Documents section.     TEST(S):   A: Right Bone Marrow Biopsy/Aspiration   B: Left Bone Marrow Biopsy/Aspiration     FINAL DIAGNOSIS:   Bone marrow, posterior iliac crest, bilateral decalcified trephine   biopsy and touch imprint; bilateral direct aspirate smear, and   concentrated aspirate smear; left particle crush; and peripheral blood   smear:       - Normocellular marrow (20-30%) with trilineage hematopoiesis     - No evidence of metastatic carcinoma     - Bone changes consistent with Paget's disease     - Peripheral blood showing normal hemogram and differential     COMMENT:   There are rare lymphoid aggregates present, consistent with reactive   lymphoid aggregates based on morphology and immunophenotype.     Iron stores appear decreased based on iron stain. Clinical correlation   is recommended and correlation with peripheral blood iron   studies/ferritin may be indicated.     Flow cytometry ([id number]) identified polytypic B cells and no aberrant   immunophenotype on T cells.     I have personally reviewed all specimens and/or slides, including  the   listed special stains, and used them with my medical judgment to   determine the final diagnosis.     Electronically signed out by:     Ese Celestin M.D., [facility]     Technical testing/processing performed at [place], [place]     CLINICAL HISTORY:   From [facility] electronic medial record; 71 year old female with a history of   ductal carcinoma in situ (2006). Bone abnormalities seen on imaging in   2013 and recently (not significantly changed). This bone marrow biopsy   is performed to rule out metastatic breast cancer.     REPORT:   Procedure/Gross Description   Aspirate(s) and trephine(s) procured by CATHERINE Posadas     Specimen sent for Special Studies:        Flow Cytometry: Bilateral aspirate        Cytogenetics: Bilateral aspirate        Molecular Diagnostics: Left aspirate     Biopsy aspiration site: [@left posterior iliac crest, right posterior   iliac crest, bilateral aspirate, sternal aspirate, aspirate only]               RIGHT    LEFT     (Reference Range)        Amount of aspirate              2.5       3.0      mL        Fat and P.V. cell layer           trace    3.0      %     (1 - 3)        Particles                        0.0       5.0      %        Myeloid-erythroid layer          1.0       6.0      %     (5 - 8)          Clot Section: No     Trephine biopsy site: Bilateral posterior iliac crest     Designated left (B) posterior iliac crest is 1 cylinder of gritty   tissue, labeled with the patient's name and hospital number, having a   diameter of 2 mm and a length of 16 mm; entirely submitted in one   cassette; acetic zinc formalin fixed, decalcified, processed, and   stained for hematoxylin and eosin per laboratory protocol.     Designated right (A) posterior iliac crest is 1 cylinder of gritty   tissue, labeled with the patient's name and hospital number, having a   diameter of 2 mm and a length of 6 mm; entirely submitted  in one   cassette; acetic zinc formalin fixed, decalcified, processed, and   stained for hematoxylin and eosin per laboratory protocol.     MICROSCOPIC DESCRIPTION:   PERIPHERAL BLOOD DATA (Date: January 11, 2017)   Patient Value (Reference Range >18 year old female)   7.7 WBC (4.0-11.0 x 10*9/L)   4.50 RBC (3.8-5.2 x 10*12/L)   13.2 HGB (11.7-15.7 g/dL)   87 MCV (78-100fL)   33.8 MCHC (31.5-36.5 g/dL)   12.7 RDW (10.0-15.0 %)   178 PLT (150-450 x 10*9/L)     PERIPHERAL BLOOD DIFFERENTIAL   (automated differential)   (Reference ranges >18 year old)     Percent   72.8 Neutrophils, segmented and bands   18.0 Lymphocytes   6.4 Monocytes   2.1 Eosinophils   0.4 Basophils   0.3 Immature granulocytes     Absolute   5.6 Neutrophils, segmented and bands (1.6 - 8.3 x 10*9/L)   1.4 Lymphocytes (0.8 - 5.3 x 10*9/L)   0.5 Monocytes (0 -1.3 x 10*9/L)   0.2 Eosinophils (0 - 0.7 x 10*9/L)   0.0 Basophils (0 - 0.2 x 10*9/L)   0.0 Immature granulocytes (0-0.4 x 10*9/L)     The red cells appear normochromic.  Poikilocytosis is minimal.   Polychromasia is not increased. Rouleaux formation is not increased. The   morphology of the platelets is normal.     Bone marrow aspirates and touch imprints of bone biopsies are reviewed.     BONE MARROW DIFFERENTIAL (500 cells on left direct smears):     Percent  Cell (reference range)   0.4 Blasts (0 - 1)   3.2 Neutrophil promyelocytes (2 - 4)   44.2 Neutrophils and precursors (54 - 63)   23.2 Erythroid precursors (18 - 24)   3.6 Monocytes (1 - 1.5)   3.2 Eosinophils (1 - 3)   0.0 Basophils (0 - 1)   19.6 Lymphocytes (8 - 12)   2.6 Plasma cells  (0 - 1.5)     Neutrophil maturation is complete. Erythroid maturation is complete.   Megakaryocytes are present.     IRON STAINS: Dacie iron stain on concentrate smears: Iron stains show 1%   sideroblasts.  No ring sideroblasts seen on scanning.     Prussian blue stain on particle crush:  Iron stores are decreased. .     TREPHINE SECTIONS:   The marrow  cellularity is variable, ranging from <5% up to 50%, overall   20-30%. The cellular composition reflects the aspirate differential.   Megakaryocytes are present. There are rare small interstitial and   juxtatrabecular lymphoid aggregates identified on the left side core on   the deepest levels. There are bone changes consistent with Paget's   disease (particularly on the right sided biopsy).     IMMUNOHISTOCHEMICAL STAINS:   Immunohistochemical stains for CD3, CD20, and cytokeratin AE1/AE3 were   performed on the left sided core. Immunohistochemical stain for   cytokeratin AE1/AE3 was performed on the right sided core.     Cytokeratin AE1/AE3 is negative.     CD3 highlights T cells and CD20 highlights B cells. The lymphoid   aggregates are a mixture of CD3 and CD20 positive cells.     Note:  These immunohistochemical stains are deemed medically necessary.   Some of the antigens may also be evaluated by flow cytometry.   Concurrent evaluation by immunohistochemistry on clot and/or trephine   sections is indicated in this case in order to correlate immunophenotype   with cell morphology and determine extent of involvement, spatial   pattern, and focality of potential disease distribution.     CPT Codes:   A: 33527-XLDU.T, 10735-TSMEL, 96247-LWMW, HBM, 55066-GHPF, 55133-PKASR,   38052-IZCX.T, 41274-YVZTS, 26452-DNB   B: 99617-FCRVN.T, 73319-DTZO, 77507-VRFHSS, HBM, 46887-EGNE,   52739-HIAEV, 53382-OOIF.T, 97079-KJZCZ, 64602-YTJVR, 87239-BNDSP,   89528-HZZ, 71568-PHD, 86071-YXN     TESTING LAB LOCATION:   [facility], [address]   [address]   [phone]     COLLECTION SITE:   Client:  [facility]   Location:  [facility] ([facility])     ASSESSMENT AND PLAN:    1.  No evidence of bone involvement with breast cancer or other malignancy.  Jenny returns to clinic for followup on her bone marrow biopsy.  We  discussed with Jenny that we have good news and that the bone marrow biopsy shows no evidence of malignancy, including no evidence of metastatic breast cancer and no evidence of plasma cell malignancy.  These concerns were raised because of some bone abnormalities that were observed on previous imaging.  I discussed with her that we recommend yearly mammography with the next mammogram to be performed in December and then she can follow up with Dr. Wray for breast care.  Jenny was very pleased with these results, as are we.    2.  The biopsy was consistent with Paget disease of bone and the plan would be for her to follow up with Endocrinology for treatment.  All of Jenny's questions were answered.   3. History of DCIS.  No evidence of recurrence.  Recommend yearly mammography.   4. Rare interstitial and juxtatrabecular lymphoid aggregates.  Incidental finding. Yearly CBC.   5.  Follow up with Dr. Wray.     Thank you, Dr. Wray, for allowing us to participate in Jenny Cruz's care.     Sincerely,    Jong Vargas M.D.      Division of Hematology, Oncology, Transplant   Department of Medicine   [facility]   [phone]       I spent 15 minutes with the patient more than 50% of which was in counseling and coordination of care.

## 2017-01-17 NOTE — NURSING NOTE
"Jenny Cruz is a 71 year old female who presents for:  Chief Complaint   Patient presents with     Oncology Clinic Visit     breast ca        Initial Vitals:  /69 mmHg  Pulse 55  Temp(Src) 98  F (36.7  C) (Oral)  Resp 12  Ht 1.65 m (5' 4.96\")  Wt 72.893 kg (160 lb 11.2 oz)  BMI 26.77 kg/m2  SpO2 97% Estimated body mass index is 26.77 kg/(m^2) as calculated from the following:    Height as of this encounter: 1.65 m (5' 4.96\").    Weight as of this encounter: 72.893 kg (160 lb 11.2 oz).. Body surface area is 1.83 meters squared. BP completed using cuff size: regular  No Pain (0) No LMP recorded. Patient is postmenopausal. Allergies and medications reviewed.     Medications: Medication refills not needed today.  Pharmacy name entered into [employer]: [facility] [address] [place] [address] AT [facility] OF  [address]    Comments: pt denies pain    5 minutes for nursing intake (face to face time)   Landen Garcia CMA          "

## 2017-04-18 LAB — COPATH REPORT: NORMAL

## 2017-04-22 LAB — COPATH REPORT: NORMAL

## 2017-09-22 ENCOUNTER — OFFICE VISIT (OUTPATIENT)
Dept: ENDOCRINOLOGY | Facility: CLINIC | Age: 72
End: 2017-09-22

## 2017-09-22 VITALS
HEART RATE: 53 BPM | WEIGHT: 159.8 LBS | HEIGHT: 64 IN | BODY MASS INDEX: 27.28 KG/M2 | DIASTOLIC BLOOD PRESSURE: 70 MMHG | SYSTOLIC BLOOD PRESSURE: 128 MMHG

## 2017-09-22 DIAGNOSIS — M88.9 PAGET'S BONE DISEASE: Primary | ICD-10-CM

## 2017-09-22 DIAGNOSIS — E04.2 MULTIPLE THYROID NODULES: ICD-10-CM

## 2017-09-22 DIAGNOSIS — M89.9 BONE LESION: ICD-10-CM

## 2017-09-22 DIAGNOSIS — R35.0 INCREASED FREQUENCY OF URINATION: ICD-10-CM

## 2017-09-22 DIAGNOSIS — Z85.3 PERSONAL HISTORY OF MALIGNANT NEOPLASM OF BREAST: ICD-10-CM

## 2017-09-22 DIAGNOSIS — M88.9 PAGET'S BONE DISEASE: ICD-10-CM

## 2017-09-22 DIAGNOSIS — E55.9 VITAMIN D DEFICIENCY: ICD-10-CM

## 2017-09-22 LAB
ALBUMIN SERPL-MCNC: 3.6 G/DL (ref 3.4–5)
ALBUMIN UR-MCNC: NEGATIVE MG/DL
ALP SERPL-CCNC: 237 U/L (ref 40–150)
ALT SERPL W P-5'-P-CCNC: 27 U/L (ref 0–50)
ANION GAP SERPL CALCULATED.3IONS-SCNC: 5 MMOL/L (ref 3–14)
APPEARANCE UR: CLEAR
AST SERPL W P-5'-P-CCNC: 28 U/L (ref 0–45)
BASOPHILS # BLD AUTO: 0 10E9/L (ref 0–0.2)
BASOPHILS NFR BLD AUTO: 0.4 %
BILIRUB SERPL-MCNC: 0.6 MG/DL (ref 0.2–1.3)
BILIRUB UR QL STRIP: NEGATIVE
BUN SERPL-MCNC: 15 MG/DL (ref 7–30)
CALCIUM SERPL-MCNC: 9.1 MG/DL (ref 8.5–10.1)
CHLORIDE SERPL-SCNC: 106 MMOL/L (ref 94–109)
CO2 SERPL-SCNC: 28 MMOL/L (ref 20–32)
COLOR UR AUTO: YELLOW
CREAT SERPL-MCNC: 0.66 MG/DL (ref 0.52–1.04)
DEPRECATED CALCIDIOL+CALCIFEROL SERPL-MC: 45 UG/L (ref 20–75)
DIFFERENTIAL METHOD BLD: NORMAL
EOSINOPHIL # BLD AUTO: 0.2 10E9/L (ref 0–0.7)
EOSINOPHIL NFR BLD AUTO: 4.8 %
ERYTHROCYTE [DISTWIDTH] IN BLOOD BY AUTOMATED COUNT: 12.7 % (ref 10–15)
GFR SERPL CREATININE-BSD FRML MDRD: 87 ML/MIN/1.7M2
GLUCOSE SERPL-MCNC: 90 MG/DL (ref 70–99)
GLUCOSE UR STRIP-MCNC: NEGATIVE MG/DL
HCT VFR BLD AUTO: 40.2 % (ref 35–47)
HGB BLD-MCNC: 13 G/DL (ref 11.7–15.7)
HGB UR QL STRIP: NEGATIVE
IMM GRANULOCYTES # BLD: 0 10E9/L (ref 0–0.4)
IMM GRANULOCYTES NFR BLD: 0.2 %
KETONES UR STRIP-MCNC: NEGATIVE MG/DL
LEUKOCYTE ESTERASE UR QL STRIP: ABNORMAL
LYMPHOCYTES # BLD AUTO: 1.6 10E9/L (ref 0.8–5.3)
LYMPHOCYTES NFR BLD AUTO: 34.3 %
MCH RBC QN AUTO: 29 PG (ref 26.5–33)
MCHC RBC AUTO-ENTMCNC: 32.3 G/DL (ref 31.5–36.5)
MCV RBC AUTO: 90 FL (ref 78–100)
MONOCYTES # BLD AUTO: 0.4 10E9/L (ref 0–1.3)
MONOCYTES NFR BLD AUTO: 8 %
NEUTROPHILS # BLD AUTO: 2.4 10E9/L (ref 1.6–8.3)
NEUTROPHILS NFR BLD AUTO: 52.3 %
NITRATE UR QL: NEGATIVE
NRBC # BLD AUTO: 0 10*3/UL
NRBC BLD AUTO-RTO: 0 /100
PH UR STRIP: 5 PH (ref 5–7)
PLATELET # BLD AUTO: 167 10E9/L (ref 150–450)
POTASSIUM SERPL-SCNC: 4.2 MMOL/L (ref 3.4–5.3)
PROT SERPL-MCNC: 7.4 G/DL (ref 6.8–8.8)
PTH-INTACT SERPL-MCNC: 62 PG/ML (ref 12–72)
RBC # BLD AUTO: 4.49 10E12/L (ref 3.8–5.2)
RBC #/AREA URNS AUTO: 1 /HPF (ref 0–2)
SODIUM SERPL-SCNC: 139 MMOL/L (ref 133–144)
SOURCE: ABNORMAL
SP GR UR STRIP: 1.01 (ref 1–1.03)
SQUAMOUS #/AREA URNS AUTO: <1 /HPF (ref 0–1)
TSH SERPL DL<=0.005 MIU/L-ACNC: 1.33 MU/L (ref 0.4–4)
UROBILINOGEN UR STRIP-MCNC: 0 MG/DL (ref 0–2)
WBC # BLD AUTO: 4.6 10E9/L (ref 4–11)
WBC #/AREA URNS AUTO: 5 /HPF (ref 0–2)

## 2017-09-22 ASSESSMENT — PAIN SCALES - GENERAL: PAINLEVEL: NO PAIN (0)

## 2017-09-22 NOTE — LETTER
9/22/2017       RE: Jenny Cruz  [address]  [place] [address]     Dear Colleague,    Thank you for referring your patient, Jenny Cruz, to the [facility] ENDOCRINOLOGY at [facility]. Please see a copy of my visit note below.         Endocrinology Note         Jenny is a 72 year old female presents today for thyroid nodules and Paget's disease .    HPI  Jenny Cruz is a pleasant 72 years old female who is here for multiple thyroid nodules and Paget's disease. She had hx of right breast cancer s/o lumpectomy and radiation in 2006 at [facility].    1) Multiple thyroid nodules: she was noted to have enlarged thyroid gland during physical exam. US thyroid in March 2016 revealed multiple thyroid nodules, the largest on measured 1.5 cm cystic like lesion. The remaining nodules ranged 1.1-1.5 spongiform in nature. One of the nodule measured 0.6 cm is isoechoic with peripheral calcification. She did have hx of right breast cancer s/o lumpectomy and radiation in 2006 at [facility]. Her mother had hx of thyroid goiter s/p removal.     She has not had any neck compression symptoms.     2) possible Paget's disease: She was evaluated by , endocrinologist at [facility] in 2009 and diagnosis of Paget's disease was made given bone scan showed increased uptake in the hips and sacrum which compatible with Paget's disease. At that time alkaline phosphatase was normal so no medical intervention was recommended.  In 2013, she saw  for evaluation of a left tibial diaphyseal intramedullary lesion diagnosed when the patient presented for discomfort in her left leg. Subsequent investigations include a bone scan as well as an MRI scan noted to have lesion in the left tibia and right postrior iliac crest. She was recommended for bone biopsy given hx of breast cancer but the patient declined. She was then seen at [facility] for second opinion and it was concluded that she  was likely having tibial diaphyseal stress fracture and recommended for conservative treatment. She last saw Endocrinologist at [facility] in 2013. Her bone Paget's disease and low bone density were stable so no mediation was indicated.    She saw  again yesterday due to concerning of Paget's disease. She has recently had mild elevation of alkaline phosphatase and PTH. Bone scan on 4/27/2016 revealed unchanged uptake in the sacrum and right ileum. Possible tiny residual uptake in the proximal left femur. In correlation with comparison studies, this is most consistent with Paget's disease.     Alkaline phosphatase ranged in 177-185 over the past year. ALK in 2013 was normal.     She did have hx of traumatic right ankle fracture when she fell on ice many years ago. She has 2-3 serving yogurts, milk and cheese daily. She is physically active with walking and exercising. She denied any back pain or bone pain. She did have DXA 2016 that showed T-score of -1.1 in the right femur, unchanged from 2009.     She tried Fosamax last year for short time but could not tolerate it due to tiredness.    Past Medical History  Paget's disease diagnosed at [facility] in 2009  DCIS right breast s/p breast conservative surgery and adjuvant irradiation in 2006  Lumbar disc disease after accident s/p surgery  in 1982    Allergies  No Known Allergies     Medications  Current Outpatient Prescriptions   Medication Sig Dispense Refill     CALCIUM-VITAMIN D PO        B Complex Vitamins (VITAMIN B COMPLEX PO)        VITAMIN E COMPLEX PO        Family History  Mother had lung cancer, thyroid goiter s/p removal  Father had CHF and obesity  She has 5 siblings who are relatively healthy.    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  Eyes: no vision change, diplopia or red eyes   Neck: no difficulty swallowing, no choking,  "no neck pain, no neck swelling  Cardiovascular: no chest pain, palpitations  Respiratory: no dyspnea, cough, shortness of breath or wheezing   GI: no nausea, vomiting, diarrhea or constipation, no abdominal pain   : no change in urine, no dysuria or hematuria  Musculoskeletal: no joint or muscle pain or swelling, no back pain or bone pain  Integumentary: no concerning lesions  Neuro: no loss of strength or sensation, no numbness or tingling, no tremor, no dizziness, no headache   Endo: no polyuria or polydipsia, no temperature intolerance   Heme/Lymph: no concerning bumps, no bleeding problems   Allergy: no environmental allergies   Psych: no depression or anxiety, no sleep problems.    Physical Exam  /70  Pulse 53  Ht 1.626 m (5' 4\")  Wt 72.5 kg (159 lb 12.8 oz)  BMI 27.43 kg/m2  Body mass index is 27.43 kg/(m^2).  Constitutional: no distress, comfortable, pleasant   Eyes: anicteric, normal extra-ocular movements, no lid lag or retraction  Neck: +visible and palpable discrete nodule on the left lobe  Cardiovascular: regular rate and rhythm, normal S1 and S2, no murmurs  Respiratory: clear to auscultation, no wheezes or crackles, normal breath sounds   Gastrointestinal:  nontender, no hepatomegaly, no masses   Musculoskeletal: no edema   Skin: no concerning lesions, no jaundice   Neurological: cranial nerves intact, 1+reflexes at patella, normal gait, no tremor on outstretched hands bilaterally  Psychological: appropriate mood   Lymphatic: no cervical  lymphadenopathy.      RESULTS  ENDO THYROID LABS-[facility] Latest Ref Rng 4/4/2016 5/13/2015   TSH 0.40 - 4.00 mU/L 1.54 1.54     US thyroid 11/2013      US thyroid 3/31/2016  Thyroid parenchyma: Homogenous thyroid with nodules  The right lobe of the thyroid measures: 1.4 x 1.9 x 5.6 cm    The thyroid isthmus measures: 0.23 centimeters  The left lobe of the thyroid measures: 2 x 1.7 x 5.4 centimeters     Right lobe:  Nodule 1: Superior  Largest diameter: " 0.6  Shape: Round  Composition: Solid  Echogenicity: Isoechoic  Margins: Smooth  Internal Echogenic Foci: Peripheral calcifications     Nodule 2: Mid  Largest diameter: 1.5  Shape: Round  Composition: Cystic  Echogenicity: Anechoic  Margins: Smooth  Internal Echogenic Foci: None     Nodule 3: Inferior  Largest diameter: 1.5  Shape: Wider-than-tall  Composition: Spongiform  Echogenicity: Isoechoic  Margins: Smooth  Internal Echogenic Foci: None     Left lobe:  Nodule 1: Superior  Largest diameter: 1.1  Shape: Wider-than-tall  Composition: Spongiform  Echogenicity: Isoechoic  Margins: Smooth  Internal Echogenic Foci: None     Many spongiform nodules similar to the previously described are seen throughout the left thyroid lobe.     Nodule 2: Inferomedial  Largest diameter: 1.2  Shape: Wider-than-tall  Composition: Spongiform  Echogenicity: Isoechoic  Margins: Smooth  Internal Echogenic Foci: None       IMPRESSION:    Multiple bilateral thyroid nodules as described above, the majority of which are spongiform in nature.    DXA 11/2013      NM bone scan 11/2013      XR Pelvis 4/25/2016  Subtle lucency in the sacral alae are not significantly changed since CT dated 10/29/2013. No fractures identified. Hip joint spaces are preserved.    Impression: Subtle lucencies in the sacral alae are not significantly changed in CT dated 10/20/13. No new concerning lesions identified.    Bone scan 4/27/2016  FINDINGS: Physiologic uptake in the bladder and kidneys is noted. Mild symmetric elevated uptake in the shoulders and feet bilaterally, likely representing degenerative change. Significantly decreased uptake in the lateral proximal left tibia since 10/16/2013, with possible punctate residual focus of uptake in the proximal most portion of the left tibia. Elevated uptake within the sacrum and right ilium, along the sacroiliac joint, is unchanged since 10/16/2013. This correlates with lucency and coarsened trabeculae seen on  comparison CT and pelvic x-ray.       IMPRESSION: Unchanged uptake in the sacrum and right ileum. Possible tiny residual uptake in the proximal left femur. In correlation with comparison studies and laboratory analysis this is most consistent with Paget's disease.          Ref. Range 10/12/2015 10:22   Alkptase % Bone Unknown 91 (H)   Alkptase % Liver Unknown 75   Alkptase % Other Unknown 0   Alkptase Isoenzymes Unknown 166 (H)     ASSESSMENT:    Jenny Cruz is a pleasant 72 years old female who is here for multiple thyroid nodules and Paget's disease. She had hx of right breast cancer s/o lumpectomy and radiation in 2006 at [facility].    1) multiple thyroid nodules: she has no compressive symptom. She did have US thyroid at [facility] in 2013 which showed small thyroid nodule. She has never had biopsy. US in March 2016 revealed multiple nodule throughout thyroid lobe. Most of the nodules are spongiform. There is one small nodule with peripheral calcification. I reviewed images today. Although it is difficult to compared the recent US with the previous one from 2013, it seems no change. I would recommend to repeat US this year.    2) Paget's disease of the bone: she was diagnosed with Paget's disease at [facility] since 2009 which was noted incidentally when she did have bone scan for breast cancer surveillance. Prior to 2015, she did have normal alkaline phosphatase so medication is not indicated. Alkaline phosphatase from bone is mildly elevated since 2015. However, she continues to be asymptomatic. Bone scan is relatively stable. She was tried on Fosamax last year but could not tolerated it due tiredness. Given she continues to be asymptomatic, I can continue to follow up bone ALK.    PLAN:   - check bone ALK, calcium, vitamin D, PTH today  - check TSH and schedule for US thyroid  - will contact patient with result    Raven Whipple MD     Division of Diabetes and  Endocrinology  Department of Medicine  [phone]

## 2017-09-22 NOTE — PROGRESS NOTES
Endocrinology Note         Jenny is a 72 year old female presents today for thyroid nodules and Paget's disease .    HPI  Jenny Cruz is a pleasant 72 years old female who is here for multiple thyroid nodules and Paget's disease. She had hx of right breast cancer s/o lumpectomy and radiation in 2006 at [facility].    1) Multiple thyroid nodules: she was noted to have enlarged thyroid gland during physical exam. US thyroid in March 2016 revealed multiple thyroid nodules, the largest on measured 1.5 cm cystic like lesion. The remaining nodules ranged 1.1-1.5 spongiform in nature. One of the nodule measured 0.6 cm is isoechoic with peripheral calcification. She did have hx of right breast cancer s/o lumpectomy and radiation in 2006 at [facility]. Her mother had hx of thyroid goiter s/p removal.     She has not had any neck compression symptoms.     2) possible Paget's disease: She was evaluated by , endocrinologist at [facility] in 2009 and diagnosis of Paget's disease was made given bone scan showed increased uptake in the hips and sacrum which compatible with Paget's disease. At that time alkaline phosphatase was normal so no medical intervention was recommended.  In 2013, she saw  for evaluation of a left tibial diaphyseal intramedullary lesion diagnosed when the patient presented for discomfort in her left leg. Subsequent investigations include a bone scan as well as an MRI scan noted to have lesion in the left tibia and right postrior iliac crest. She was recommended for bone biopsy given hx of breast cancer but the patient declined. She was then seen at [facility] for second opinion and it was concluded that she was likely having tibial diaphyseal stress fracture and recommended for conservative treatment. She last saw Endocrinologist at [facility] in 2013. Her bone Paget's disease and low bone density were stable so no mediation was indicated.    She saw  again yesterday due to  concerning of Paget's disease. She has recently had mild elevation of alkaline phosphatase and PTH. Bone scan on 4/27/2016 revealed unchanged uptake in the sacrum and right ileum. Possible tiny residual uptake in the proximal left femur. In correlation with comparison studies, this is most consistent with Paget's disease.     Alkaline phosphatase ranged in 177-185 over the past year. ALK in 2013 was normal.     She did have hx of traumatic right ankle fracture when she fell on ice many years ago. She has 2-3 serving yogurts, milk and cheese daily. She is physically active with walking and exercising. She denied any back pain or bone pain. She did have DXA 2016 that showed T-score of -1.1 in the right femur, unchanged from 2009.     She tried Fosamax last year for short time but could not tolerate it due to tiredness.    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    Social History  no smoking, no EtOH  She lives by herself, has 3 adult children  She owns [employer]    ROS  Constitutional: she is very active.  Eyes: no vision change, diplopia or red eyes   Neck: no difficulty swallowing, no choking, no neck pain, no neck swelling  Cardiovascular: no chest pain, palpitations  Respiratory: no dyspnea, cough, shortness of breath or wheezing   GI: no nausea, vomiting, diarrhea or constipation, no abdominal pain   : no change in urine, no dysuria or hematuria  Musculoskeletal:  "no joint or muscle pain or swelling, no back pain or bone pain  Integumentary: no concerning lesions  Neuro: no loss of strength or sensation, no numbness or tingling, no tremor, no dizziness, no headache   Endo: no polyuria or polydipsia, no temperature intolerance   Heme/Lymph: no concerning bumps, no bleeding problems   Allergy: no environmental allergies   Psych: no depression or anxiety, no sleep problems.    Physical Exam  /70  Pulse 53  Ht 1.626 m (5' 4\")  Wt 72.5 kg (159 lb 12.8 oz)  BMI 27.43 kg/m2  Body mass index is 27.43 kg/(m^2).  Constitutional: no distress, comfortable, pleasant   Eyes: anicteric, normal extra-ocular movements, no lid lag or retraction  Neck: +visible and palpable discrete nodule on the left lobe  Cardiovascular: regular rate and rhythm, normal S1 and S2, no murmurs  Respiratory: clear to auscultation, no wheezes or crackles, normal breath sounds   Gastrointestinal:  nontender, no hepatomegaly, no masses   Musculoskeletal: no edema   Skin: no concerning lesions, no jaundice   Neurological: cranial nerves intact, 1+reflexes at patella, normal gait, no tremor on outstretched hands bilaterally  Psychological: appropriate mood   Lymphatic: no cervical  lymphadenopathy.      RESULTS  ENDO THYROID LABS-[facility] Latest Ref Rng 4/4/2016 5/13/2015   TSH 0.40 - 4.00 mU/L 1.54 1.54     US thyroid 11/2013      US thyroid 3/31/2016  Thyroid parenchyma: Homogenous thyroid with nodules  The right lobe of the thyroid measures: 1.4 x 1.9 x 5.6 cm    The thyroid isthmus measures: 0.23 centimeters  The left lobe of the thyroid measures: 2 x 1.7 x 5.4 centimeters     Right lobe:  Nodule 1: Superior  Largest diameter: 0.6  Shape: Round  Composition: Solid  Echogenicity: Isoechoic  Margins: Smooth  Internal Echogenic Foci: Peripheral calcifications     Nodule 2: Mid  Largest diameter: 1.5  Shape: Round  Composition: Cystic  Echogenicity: Anechoic  Margins: Smooth  Internal Echogenic Foci: " None     Nodule 3: Inferior  Largest diameter: 1.5  Shape: Wider-than-tall  Composition: Spongiform  Echogenicity: Isoechoic  Margins: Smooth  Internal Echogenic Foci: None     Left lobe:  Nodule 1: Superior  Largest diameter: 1.1  Shape: Wider-than-tall  Composition: Spongiform  Echogenicity: Isoechoic  Margins: Smooth  Internal Echogenic Foci: None     Many spongiform nodules similar to the previously described are seen throughout the left thyroid lobe.     Nodule 2: Inferomedial  Largest diameter: 1.2  Shape: Wider-than-tall  Composition: Spongiform  Echogenicity: Isoechoic  Margins: Smooth  Internal Echogenic Foci: None       IMPRESSION:    Multiple bilateral thyroid nodules as described above, the majority of which are spongiform in nature.    DXA 11/2013      NM bone scan 11/2013      XR Pelvis 4/25/2016  Subtle lucency in the sacral alae are not significantly changed since CT dated 10/29/2013. No fractures identified. Hip joint spaces are preserved.    Impression: Subtle lucencies in the sacral alae are not significantly changed in CT dated 10/20/13. No new concerning lesions identified.    Bone scan 4/27/2016  FINDINGS: Physiologic uptake in the bladder and kidneys is noted. Mild symmetric elevated uptake in the shoulders and feet bilaterally, likely representing degenerative change. Significantly decreased uptake in the lateral proximal left tibia since 10/16/2013, with possible punctate residual focus of uptake in the proximal most portion of the left tibia. Elevated uptake within the sacrum and right ilium, along the sacroiliac joint, is unchanged since 10/16/2013. This correlates with lucency and coarsened trabeculae seen on comparison CT and pelvic x-ray.       IMPRESSION: Unchanged uptake in the sacrum and right ileum. Possible tiny residual uptake in the proximal left femur. In correlation with comparison studies and laboratory analysis this is most consistent with Paget's disease.          Ref. Range  10/12/2015 10:22   Alkptase % Bone Unknown 91 (H)   Alkptase % Liver Unknown 75   Alkptase % Other Unknown 0   Alkptase Isoenzymes Unknown 166 (H)     ASSESSMENT:    Jenny Cruz is a pleasant 72 years old female who is here for multiple thyroid nodules and Paget's disease. She had hx of right breast cancer s/o lumpectomy and radiation in 2006 at [facility].    1) multiple thyroid nodules: she has no compressive symptom. She did have US thyroid at [facility] in 2013 which showed small thyroid nodule. She has never had biopsy. US in March 2016 revealed multiple nodule throughout thyroid lobe. Most of the nodules are spongiform. There is one small nodule with peripheral calcification. I reviewed images today. Although it is difficult to compared the recent US with the previous one from 2013, it seems no change. I would recommend to repeat US this year.    2) Paget's disease of the bone: she was diagnosed with Paget's disease at [facility] since 2009 which was noted incidentally when she did have bone scan for breast cancer surveillance. Prior to 2015, she did have normal alkaline phosphatase so medication is not indicated. Alkaline phosphatase from bone is mildly elevated since 2015. However, she continues to be asymptomatic. Bone scan is relatively stable. She was tried on Fosamax last year but could not tolerated it due tiredness. Given she continues to be asymptomatic, I can continue to follow up bone ALK.    PLAN:   - check bone ALK, calcium, vitamin D, PTH today  - check TSH and schedule for US thyroid  - will contact patient with result    Raven Whipple MD     Division of Diabetes and Endocrinology  Department of Medicine  [phone]

## 2017-09-22 NOTE — MR AVS SNAPSHOT
After Visit Summary   9/22/2017    Jenny Cruz    MRN: [id number]           Patient Information     Date Of Birth          1945        Visit Information        Provider Department      9/22/2017 7:50 AM Raven Whipple MD [facility]        Today's Diagnoses     Paget's bone disease    -  1    Multiple thyroid nodules        Bone lesion        Vitamin D deficiency        Increased frequency of urination           Follow-ups after your visit        Your next 10 appointments already scheduled     Sep 22, 2017  8:30 AM CDT   LAB with  LAB   [facility])    [address] [place] [id number] [phone]           Patient must bring picture ID. Patient should be prepared to give a urine specimen  Please do not eat 10-12 hours before your appointment if you are coming in fasting for labs on lipids, cholesterol, or glucose (sugar). Pregnant women should follow their Care Team instructions. Water with medications is okay. Do not drink coffee or other fluids. If you have concerns about taking  your medications, please ask at office or if scheduling via CNZZ, send a message by clicking on Secure Messaging, Message Your Care Team.            Sep 25, 2017  7:45 AM CDT   US THYROID with UCUS1   [facility] ([facility])    [address] [place] [address] [phone]           Please bring a list of your medicines (including vitamins, minerals and over-the-counter drugs). Also, tell your doctor about any allergies you may have. Wear comfortable clothes and leave your valuables at home.  You do not need to do anything special to prepare for your exam.  Please call the Imaging Department at your exam site with any questions.            Sep 25, 2017  8:35 AM CDT   (Arrive by 8:20 AM)   Return Visit with Jong Wray MD   [facility] (  [facility])    [address]  4th Floor  [facility] [id number] [phone]              Future tests that were ordered for you today     Open Future Orders        Priority Expected Expires Ordered    US Thyroid Routine  4/20/2018 9/22/2017    Routine UA with Micro Reflex to Culture Routine  9/22/2018 9/22/2017    Vitamin D Deficiency (D3 Only) Routine 9/22/2017 9/22/2018 9/22/2017    Albumin level Routine 9/22/2017 9/22/2018 9/22/2017    Bone specific alk phosphatase Routine 9/22/2017 9/22/2018 9/22/2017    Calcium Routine 9/22/2017 9/22/2018 9/22/2017    Parathyroid Hormone Intact Routine 9/22/2017 9/22/2018 9/22/2017    TSH Routine 9/22/2017 9/22/2018 9/22/2017            Who to contact     Please call your clinic at [phone] to:    Ask questions about your health    Make or cancel appointments    Discuss your medicines    Learn about your test results    Speak to your doctor   If you have compliments or concerns about an experience at your clinic, or if you wish to file a complaint, please contact [facility] Patient Relations at [phone] or email us at [email or web address]         Additional Information About Your Visit        [employer] Information     [employer] gives you secure access to your electronic health record. If you see a primary care provider, you can also send messages to your care team and make appointments. If you have questions, please call your primary care clinic.  If you do not have a primary care provider, please call [phone] and they will assist you.      [employer] is an electronic gateway that provides easy, online access to your medical records. With [employer], you can request a clinic appointment, read your test results, renew a prescription or communicate with your care team.     To access your existing account, please contact your [facility] or call [phone] for assistance.    "     Care EveryWhere ID     This is your Care EveryWhere ID. This could be used by other organizations to access your [place] medical records  [id number]        Your Vitals Were     Pulse Height BMI (Body Mass Index)             53 1.626 m (5' 4\") 27.43 kg/m2          Blood Pressure from Last 3 Encounters:   09/22/17 128/70   01/17/17 117/69   01/11/17 120/73    Weight from Last 3 Encounters:   09/22/17 72.5 kg (159 lb 12.8 oz)   01/17/17 72.9 kg (160 lb 11.2 oz)   01/11/17 71.1 kg (156 lb 12 oz)               Primary Care Provider Office Phone # Fax #    Jong Wray [phone] [address] [address]        Equal Access to Services     [facility] : Hadii mohan elizabeth hadasho Soomaali, waaxda luqadaha, qaybta kaalmada adeegyada, david nielsen haykatlyn de jesus . So [facility] [phone].    ATENCIÓN: Si habla español, tiene a melvin disposición servicios gratuitos de asistencia lingüística. Llame al [phone].    We comply with applicable federal civil rights laws and [place] laws. We do not discriminate on the basis of race, color, national origin, age, disability sex, sexual orientation or gender identity.            Thank you!     Thank you for choosing [facility]  for your care. Our goal is always to provide you with excellent care. Hearing back from our patients is one way we can continue to improve our services. Please take a few minutes to complete the written survey that you may receive in the mail after your visit with us. Thank you!             Your Updated Medication List - Protect others around you: Learn how to safely use, store and throw away your medicines at www.disposemymeds.org.          This list is accurate as of: 9/22/17  8:29 AM.  Always use your most recent med list.                   Brand Name Dispense Instructions for use Diagnosis    CALCIUM-VITAMIN D PO           VITAMIN B COMPLEX PO           VITAMIN E COMPLEX PO        "

## 2017-09-23 LAB
ALP BONE SERPL-MCNC: 59.2 UG/L
BACTERIA SPEC CULT: NO GROWTH
Lab: NORMAL
SPECIMEN SOURCE: NORMAL

## 2017-09-25 ENCOUNTER — OFFICE VISIT (OUTPATIENT)
Dept: INTERNAL MEDICINE | Facility: CLINIC | Age: 72
End: 2017-09-25

## 2017-09-25 ENCOUNTER — TELEPHONE (OUTPATIENT)
Dept: GASTROENTEROLOGY | Facility: CLINIC | Age: 72
End: 2017-09-25

## 2017-09-25 VITALS
WEIGHT: 159.7 LBS | HEART RATE: 54 BPM | DIASTOLIC BLOOD PRESSURE: 70 MMHG | SYSTOLIC BLOOD PRESSURE: 120 MMHG | BODY MASS INDEX: 27.41 KG/M2

## 2017-09-25 DIAGNOSIS — Z23 NEED FOR PROPHYLACTIC VACCINATION AND INOCULATION AGAINST INFLUENZA: ICD-10-CM

## 2017-09-25 DIAGNOSIS — Z12.11 SPECIAL SCREENING FOR MALIGNANT NEOPLASMS, COLON: Primary | ICD-10-CM

## 2017-09-25 DIAGNOSIS — Z91.81 AT RISK FOR FALLING: ICD-10-CM

## 2017-09-25 ASSESSMENT — PAIN SCALES - GENERAL: PAINLEVEL: NO PAIN (0)

## 2017-09-25 NOTE — MR AVS SNAPSHOT
After Visit Summary   9/25/2017    Jenny Cruz    MRN: [id number]           Patient Information     Date Of Birth          1945        Visit Information        Provider Department      9/25/2017 8:35 AM Jong Wray MD [facility]        Today's Diagnoses     Special screening for malignant neoplasms, colon    -  1    At risk for falling        Need for prophylactic vaccination and inoculation against influenza          Care Instructions    Primary Care Center: [phone]     Primary Care Center Medication Refill Request Information:  * Please contact your pharmacy regarding ANY request for medication refills.  ** [facility] Prescription Fax = [phone]  * Please allow 3 business days for routine medication refills.  * Please allow 5 business days for controlled substance medication refills.     Primary Care Center Test Result notification information:  *You will be notified with in 7-10 days of your appointment day regarding the results of your test.  If you are on MyChart you will be notified as soon as the provider has reviewed the results and signed off on them.            Follow-ups after your visit        Additional Services     GI Procedure Referral       Last Lab Result: Creatinine (mg/dL)       Date                     Value                 09/22/2017               0.66             ----------  Body mass index is 27.41 kg/(m^2).     Needed:  No  Language:  English    Patient will be contacted to schedule procedure.     Please be aware that coverage of these services is subject to the terms and limitations of your health insurance plan.  Call member services at your health plan with any benefit or coverage questions.  Any procedures must be performed at a [place] facility OR coordinated by your clinic's referral office.    Please bring the following with you to your appointment:    (1) Any X-Rays, CTs or MRIs which have been performed.  Contact the facility  where they were done to arrange for  prior to your scheduled appointment.    (2) List of current medications   (3) This referral request   (4) Any documents/labs given to you for this referral                  Your next 10 appointments already scheduled     Nov 03, 2017  7:45 AM CDT   US RENAL COMPLETE with UCUS1   [facility] ([facility])    [address]  1st Floor  [facility] [facility] [address] [phone]           Please bring a list of your medicines (including vitamins, minerals and over-the-counter drugs). Also, tell your doctor about any allergies you may have. Wear comfortable clothes and leave your valuables at home.  You do not need to do anything special to prepare for your exam.  Please call the Imaging Department at your exam site with any questions.              Who to contact     Please call your clinic at [phone] to:    Ask questions about your health    Make or cancel appointments    Discuss your medicines    Learn about your test results    Speak to your doctor   If you have compliments or concerns about an experience at your clinic, or if you wish to file a complaint, please contact [facility] Patient Relations at [phone] or email us at [email or web address]         Additional Information About Your Visit        [employer] Information     [employer] gives you secure access to your electronic health record. If you see a primary care provider, you can also send messages to your care team and make appointments. If you have questions, please call your primary care clinic.  If you do not have a primary care provider, please call [phone] and they will assist you.      [employer] is an electronic gateway that provides easy, online access to your medical records. With [employer], you can request a clinic appointment, read your test results, renew a prescription or communicate with your care team.     To access  your existing account, please contact your [facility] or call [phone] for assistance.        Care EveryWhere ID     This is your Care EveryWhere ID. This could be used by other organizations to access your [place] medical records  [id number]        Your Vitals Were     Pulse Breastfeeding? BMI (Body Mass Index)             54 No 27.41 kg/m2          Blood Pressure from Last 3 Encounters:   09/25/17 120/70   09/22/17 128/70   01/17/17 117/69    Weight from Last 3 Encounters:   09/25/17 72.4 kg (159 lb 11.2 oz)   09/22/17 72.5 kg (159 lb 12.8 oz)   01/17/17 72.9 kg (160 lb 11.2 oz)              We Performed the Following     FLU VACCINE, INCREASED ANTIGEN, PRESV FREE, AGE 65+ [[address]]     GI Procedure Referral        Primary Care Provider Office Phone # Fax #    Jong Wray [phone]       [address] [address]        Equal Access to Services     CALEB NICKERSON : Hadii aad ku hadasho Soomaali, waaxda luqadaha, qaybta kaalmada adeegyada, david nielsen haykatlyn de jesus . So [facility] [phone].    ATENCIÓN: Si habla español, tiene a melvin disposición servicios gratuitos de asistencia lingüística. Llame al [phone].    We comply with applicable federal civil rights laws and [place] laws. We do not discriminate on the basis of race, color, national origin, age, disability sex, sexual orientation or gender identity.            Thank you!     Thank you for choosing [facility]  for your care. Our goal is always to provide you with excellent care. Hearing back from our patients is one way we can continue to improve our services. Please take a few minutes to complete the written survey that you may receive in the mail after your visit with us. Thank you!             Your Updated Medication List - Protect others around you: Learn how to safely use, store and throw away your medicines at www.disposemymeds.org.           This list is accurate as of: 9/25/17  9:10 AM.  Always use your most recent med list.                   Brand Name Dispense Instructions for use Diagnosis    CALCIUM-VITAMIN D PO           VITAMIN B COMPLEX PO           VITAMIN E COMPLEX PO

## 2017-09-25 NOTE — PROGRESS NOTES
HPI:    Pt. Comes in for follow up today. Overall she is doing well. She  but is still able to exercise by walking and denies any CP or SOB. She does not smoke nor abuse EtOH. She states she gets outside  [facility] annual exam. She is still due for colonoscopy. She states she also follows up in outside dermatology. She o/w denies additional HEENT, cardiopulmonary, abdominal, , GYN, neurological, systemic, endocrine, lymphatic, psychiatric complaints. She had an abdominal U/S at [facility] several years ago that may have had increased size. She did stress echo cardiac test 9/17/15 that was normal.  She had slightly elevated liver tests last check in 10/15 and this was felt from bone and possible Paget's disease. She was referred to Endocrinology for this. She had recent thyroid U/S (3/31/16)  Showing a cyst. She was seen in Endo 9/22/17 for thyroid and bone density and possible Paget's. She saw Dr. Medrano ortho 4/25/16 with plain films and bone scan. She saw Dr. Medrano again 12/12/16.       PMH:    1. R Breast CA 2006; lumpectomy and XRT.   2. Lumbar back surgery   3. Paget's dx. [place] 2009  4. 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 Mother with thyroid disease, family hx. Breast Cancer      PE:    Vitals noted gen nad cooperative alert, HEENT, ears normal oropharynx clear no exudate, neck supple nl rom adenopathy, LCTA, B, normal resp. System exam, RRR, S1, S2, no MRG, abdomen, nt, nd no masses, normal neurological exam. She has no muscle tenderness to palpation. She had palpable B LE pulses.     Results for orders placed or performed in visit on 09/22/17   CBC with platelets differential   Result Value Ref Range    WBC 4.6 4.0 - 11.0 10e9/L    RBC Count 4.49 3.8 - 5.2 10e12/L    Hemoglobin 13.0 11.7 - 15.7 g/dL    Hematocrit 40.2 35.0 - 47.0 %    MCV 90 78 - 100 fl    MCH 29.0 26.5 - 33.0 pg    MCHC 32.3 31.5 - 36.5 g/dL    RDW 12.7 10.0 - 15.0 %    Platelet Count 167 150 - 450 10e9/L    Diff Method Automated Method      % Neutrophils 52.3 %    % Lymphocytes 34.3 %    % Monocytes 8.0 %    % Eosinophils 4.8 %    % Basophils 0.4 %    % Immature Granulocytes 0.2 %    Nucleated RBCs 0 0 /100    Absolute Neutrophil 2.4 1.6 - 8.3 10e9/L    Absolute Lymphocytes 1.6 0.8 - 5.3 10e9/L    Absolute Monocytes 0.4 0.0 - 1.3 10e9/L    Absolute Eosinophils 0.2 0.0 - 0.7 10e9/L    Absolute Basophils 0.0 0.0 - 0.2 10e9/L    Abs Immature Granulocytes 0.0 0 - 0.4 10e9/L    Absolute Nucleated RBC 0.0    Comprehensive metabolic panel   Result Value Ref Range    Sodium 139 133 - 144 mmol/L    Potassium 4.2 3.4 - 5.3 mmol/L    Chloride 106 94 - 109 mmol/L    Carbon Dioxide 28 20 - 32 mmol/L    Anion Gap 5 3 - 14 mmol/L    Glucose 90 70 - 99 mg/dL    Urea Nitrogen 15 7 - 30 mg/dL    Creatinine 0.66 0.52 - 1.04 mg/dL    GFR Estimate 87 >60 mL/min/1.7m2    GFR Estimate If Black >90 >60 mL/min/1.7m2    Calcium 9.1 8.5 - 10.1 mg/dL    Bilirubin Total 0.6 0.2 - 1.3 mg/dL    Albumin 3.6 3.4 - 5.0 g/dL    Protein Total 7.4 6.8 - 8.8 g/dL    Alkaline Phosphatase 237 (H) 40 - 150 U/L    ALT 27 0 - 50 U/L    AST 28 0 - 45 U/L   Vitamin D Deficiency (D3 Only)   Result Value Ref Range    Vitamin D Deficiency screening 45 20 - 75 ug/L   Bone specific alk phosphatase   Result Value Ref Range    Bone Spec Alk Phosphatase 59.2 ug/L   Parathyroid Hormone Intact   Result Value Ref Range    Parathyroid Hormone Intact 62 12 - 72 pg/mL   TSH   Result Value Ref Range    TSH 1.33 0.40 - 4.00 mU/L   Routine UA with Micro Reflex to Culture   Result Value Ref Range    Color Urine Yellow     Appearance Urine Clear     Glucose Urine Negative NEG^Negative mg/dL    Bilirubin Urine Negative NEG^Negative    Ketones Urine Negative NEG^Negative mg/dL    Specific Gravity Urine 1.015 1.003 - 1.035    Blood Urine Negative NEG^Negative    pH Urine 5.0 5.0 - 7.0 pH    Protein Albumin Urine Negative NEG^Negative mg/dL    Urobilinogen mg/dL 0.0 0.0 - 2.0 mg/dL    Nitrite Urine Negative  NEG^Negative    Leukocyte Esterase Urine Moderate (A) NEG^Negative    Source Midstream Urine     WBC Urine 5 (H) 0 - 2 /HPF    RBC Urine 1 0 - 2 /HPF    Squamous Epithelial /HPF Urine <1 0 - 1 /HPF   Urine Culture Aerobic Bacterial   Result Value Ref Range    Specimen Description Midstream Urine     Special Requests Specimen received in preservative     Culture Micro No growth        A/P:    1. RTHC; Prevnar 13 done 5/15, as above she still needs colonoscopy ordered 5/15, 9/6/16 and again today.  She states she got the Shingles vaccine about 3 years ago. Flu shot today  2. Stress test echo exercise for previous fatigue normal 9/17/15 and checked TSH 5/15. She is still walking w/o complaints..   3.U/S to screen for AAA 9/15 as normal  4. Elevated liver tests bone related from Paget's disease and I recommended she follow up in Endo for this. Also placed orthopedics referral to Dr. Medrano. She has seen Endo 9/22/17 and Dr. Medrano 4/25/16 and again 12/12/16.   5. Thyroid cyst she had Endo appt. With Dr. Whipple 9/22/17. Thyroid U/S today 9/25/17.  6. H/o breast cancer; Paget's. Will have pt. See Dr. Vargas, ONC for opinion regarding any concern (and thought's of bone bx. Entertained by Dr. Medrano) regarding her h/o breast cancer and Paget's She saw Dr. Vargas ONC 1/15/17; mammogram 12/16  7. Recommended she get renal U/S ordered 12/23/16 to follow up on renal angiomyolipoma    I recommend she see me back to review colonoscopy and renal U/S.       Total time spent 25 minutes.  More than 50% of the time spent with Ms. Cruz on counseling / coordinating her care

## 2017-09-25 NOTE — NURSING NOTE
Injectable Influenza Immunization Documentation      1.  Has the patient received the information for the injectable influenza vaccine? YES    2. Is the patient 6 months of age or older? YES    3. Does the patient have any of the following contraindications?          Severe allergy to eggs? No     Severe allergic reaction to previous influenza vaccines? No     Allergy to contact lens solution/thimerosol? No     History of Guillain-[place] syndrome? No     Undergoing chemotherapy or radiation therapy?       (vaccine should be given at least 2 weeks prior or 3 weeks after)  No     Currently have moderate or severe illness? No         4.  The vaccine has been administered and the patient was instructed to wait 15 minutes before leaving the building in the event of an allergic reaction: YES    Vaccination given by Pilar Montano LPN at 8:49 AM on 9/25/2017.

## 2017-09-25 NOTE — NURSING NOTE
Chief Complaint   Patient presents with     Results     Patient here to go over test results.     Pilar Montano LPN at 8:16 AM on 9/25/2017.

## 2017-09-27 ENCOUNTER — TELEPHONE (OUTPATIENT)
Dept: ENDOCRINOLOGY | Facility: CLINIC | Age: 72
End: 2017-09-27

## 2017-09-27 NOTE — TELEPHONE ENCOUNTER
Ref. Range 9/22/2017 09:04   Sodium Latest Ref Range: 133 - 144 mmol/L 139   Potassium Latest Ref Range: 3.4 - 5.3 mmol/L 4.2   Chloride Latest Ref Range: 94 - 109 mmol/L 106   Carbon Dioxide Latest Ref Range: 20 - 32 mmol/L 28   Urea Nitrogen Latest Ref Range: 7 - 30 mg/dL 15   Creatinine Latest Ref Range: 0.52 - 1.04 mg/dL 0.66   GFR Estimate Latest Ref Range: >60 mL/min/1.7m2 87   GFR Estimate If Black Latest Ref Range: >60 mL/min/1.7m2 >90   Calcium Latest Ref Range: 8.5 - 10.1 mg/dL 9.1   Anion Gap Latest Ref Range: 3 - 14 mmol/L 5   Albumin Latest Ref Range: 3.4 - 5.0 g/dL 3.6   Protein Total Latest Ref Range: 6.8 - 8.8 g/dL 7.4   Bilirubin Total Latest Ref Range: 0.2 - 1.3 mg/dL 0.6   Alkaline Phosphatase Latest Ref Range: 40 - 150 U/L 237 (H)   ALT Latest Ref Range: 0 - 50 U/L 27   AST Latest Ref Range: 0 - 45 U/L 28   Bone Spec Alk Phosphatase Latest Units: ug/L 59.2   TSH Latest Ref Range: 0.40 - 4.00 mU/L 1.33   Vitamin D Deficiency screening Latest Ref Range: 20 - 75 ug/L 45     Thyroid ultrasound 9/25/17  Thyroid parenchyma: Is heterogeneous.  The right lobe of the thyroid measures: 1.6 x 2.1 x 5.3 cm   The thyroid isthmus measures: 0.3   The left lobe of the thyroid measures: 1.9 x 1.8 x 4.6 cm      Right lobe:  Nodule 1:  Nodule measurement: 0.5 x 0.5 x 0.6 cm , previously 0.6 x 0.6 x 0.6 cm  Echogenicity: Isoechoic  Consistency: solid  Calcifications: Peripheral  Hypervascular: no  Interval growth (>20%): no     Nodule 2:  Nodule measurement: 1.3 x 1.1 x 1.4 cm , previously 1.4 x 1.1 x 1.5 cm  Echogenicity: Family anechoic, with a isoechoic to hyperechoic region  laterally  Consistency: mixed.    Calcifications: no  Hypervascular: no  Interval growth (>20%): No     Nodule 3:  Nodule measurement: 1.1 x 0.9 x 1.5 cm , previously 0.9 x 0.9 x 1.5 cm  Echogenicity: Predominant isoechoic  Consistency: spongiform  Calcifications: no  Hypervascular: yes  Interval growth (>20%): no     Isthmus: No  nodule     Left Lobe:   Nodule 1:  Nodule measurement: 0.9 x 0.5 x 1.0 cm , 0.8 x 0.5 x 1.1 cm  Echogenicity: Isoechoic  Consistency: spongiform  Calcifications: None  Hypervascular: Mildly increased vascularity  Interval growth (>20%): no     Nodule 2:  Nodule measurement: 1.4 x 0.6 x 1.2 cm , 1.0 x 0.7 x 1.2 cm  Echogenicity: Predominantly isoechoic  Consistency: spongiform  Calcifications: None  Hypervascular: None  Interval growth (>20%): no     Nodule 3: Cyst in the right thyroid N he has a solid component  Nodule measurement: 0.7 x 0.7 x 0.9 cm , previously not measured or  not seen.  Echogenicity: , Anechoic with a peripheral hyperechoic nodule  Consistency: Cystic with a solid nodule  Calcifications: no  Hypervascular: no  Interval growth (>20%): N/A     Impression:  1.  Bilateral thyroid nodules as above.  2.  There is a left sided nodule (labeled as left nodule 3) which is cystic with a solid mural component, possibly new from prior study or  more well seen on current study.   Recommend continued follow up study.   3.  Left sided nodule (labeled as nodule 2) demonstrates possible increased size from prior study, from 1.2 cm to 1.4 cm currently.  Recommend continued follow-up, alternatively sampling may be considered.     Ref. Range 9/22/2017 09:07   Color Urine Unknown Yellow   Appearance Urine Unknown Clear   Glucose Urine Latest Ref Range: NEG^Negative mg/dL Negative   Bilirubin Urine Latest Ref Range: NEG^Negative  Negative   Ketones Urine Latest Ref Range: NEG^Negative mg/dL Negative   Specific Gravity Urine Latest Ref Range: 1.003 - 1.035  1.015   pH Urine Latest Ref Range: 5.0 - 7.0 pH 5.0   Protein Albumin Urine Latest Ref Range: NEG^Negative mg/dL Negative   Urobilinogen mg/dL Latest Ref Range: 0.0 - 2.0 mg/dL 0.0   Nitrite Urine Latest Ref Range: NEG^Negative  Negative   Blood Urine Latest Ref Range: NEG^Negative  Negative   Leukocyte Esterase Urine Latest Ref Range: NEG^Negative  Moderate (A)    Source Unknown Midstream Urine   WBC Urine Latest Ref Range: 0 - 2 /HPF 5 (H)   RBC Urine Latest Ref Range: 0 - 2 /HPF 1   Squamous Epithelial /HPF Urine Latest Ref Range: 0 - 1 /HPF <1     UC -- no growth    Reviewed lab and image    Plan:   -no bisphosphonate at this time due to asymptomatic. She tried Fosamax last year but could not tolerate it.  -f/u lab next year  -f/u ultrasound thyroid next year  -she would also want to discuss about weight loss next visit  -discussed with pt    RTC in [place] next year    Raven Whipple MD    Division of Diabetes and Endocrinology  Department of Medicine  [phone]

## 2017-11-03 ENCOUNTER — TELEPHONE (OUTPATIENT)
Dept: INTERNAL MEDICINE | Facility: CLINIC | Age: 72
End: 2017-11-03

## 2017-11-03 DIAGNOSIS — N28.89 RENAL MASS: Primary | ICD-10-CM

## 2017-11-03 NOTE — TELEPHONE ENCOUNTER
Placed Urology and MRI orders for renal mass    ANITA Wray        Dear Jenny;    I have reviewed your kidney ultrasound and I have the following recommendations:    (1) MRI scan of the kidneys and I have ordered this today    (2) After the MRI scan see the Urology doctors and I placed a referral today    You can call [phone] to schedule both these appointments    ANITA Wray MD

## 2017-11-06 ENCOUNTER — PRE VISIT (OUTPATIENT)
Dept: UROLOGY | Facility: CLINIC | Age: 72
End: 2017-11-06

## 2017-11-06 ENCOUNTER — HOSPITAL ENCOUNTER (OUTPATIENT)
Dept: MRI IMAGING | Facility: CLINIC | Age: 72
Discharge: HOME | End: 2017-11-06
Attending: INTERNAL MEDICINE | Admitting: INTERNAL MEDICINE
Payer: MEDICARE

## 2017-11-06 DIAGNOSIS — N28.89 RENAL MASS: ICD-10-CM

## 2017-11-06 LAB
CREAT BLD-MCNC: 0.7 MG/DL (ref 0.52–1.04)
GFR SERPL CREATININE-BSD FRML MDRD: 82 ML/MIN/1.7M2

## 2017-11-06 PROCEDURE — 74183 MRI ABD W/O CNTR FLWD CNTR: CPT

## 2017-11-06 PROCEDURE — 82565 ASSAY OF CREATININE: CPT

## 2017-11-06 PROCEDURE — 25000128 H RX IP 250 OP 636: Performed by: INTERNAL MEDICINE

## 2017-11-06 PROCEDURE — A9585 GADOBUTROL INJECTION: HCPCS | Performed by: INTERNAL MEDICINE

## 2017-11-06 RX ORDER — GADOBUTROL 604.72 MG/ML
8 INJECTION INTRAVENOUS ONCE
Status: COMPLETED | OUTPATIENT
Start: 2017-11-06 | End: 2017-11-06

## 2017-11-06 RX ADMIN — GADOBUTROL 7 ML: 604.72 INJECTION INTRAVENOUS at 18:57

## 2017-11-07 ENCOUNTER — OFFICE VISIT (OUTPATIENT)
Dept: UROLOGY | Facility: CLINIC | Age: 72
End: 2017-11-07

## 2017-11-07 ENCOUNTER — TELEPHONE (OUTPATIENT)
Dept: INTERNAL MEDICINE | Facility: CLINIC | Age: 72
End: 2017-11-07

## 2017-11-07 VITALS
HEART RATE: 52 BPM | BODY MASS INDEX: 27.59 KG/M2 | DIASTOLIC BLOOD PRESSURE: 69 MMHG | SYSTOLIC BLOOD PRESSURE: 116 MMHG | HEIGHT: 64 IN | WEIGHT: 161.6 LBS

## 2017-11-07 DIAGNOSIS — N28.1 RENAL CYST: Primary | ICD-10-CM

## 2017-11-07 DIAGNOSIS — N28.89 RENAL MASS: Primary | ICD-10-CM

## 2017-11-07 ASSESSMENT — ENCOUNTER SYMPTOMS
DECREASED APPETITE: 0
WEIGHT LOSS: 0
ALTERED TEMPERATURE REGULATION: 1
POLYDIPSIA: 0
FATIGUE: 1
CHILLS: 1
HALLUCINATIONS: 0
POLYPHAGIA: 0
FEVER: 0
INCREASED ENERGY: 1

## 2017-11-07 ASSESSMENT — PAIN SCALES - GENERAL: PAINLEVEL: NO PAIN (0)

## 2017-11-07 NOTE — NURSING NOTE
"Chief Complaint   Patient presents with     Consult     Renal mass seen on imaging       Blood pressure 116/69, pulse 52, height 1.626 m (5' 4\"), weight 73.3 kg (161 lb 9.6 oz), not currently breastfeeding. Body mass index is 27.74 kg/(m^2).    Patient Active Problem List   Diagnosis     Bone lesion     Personal history of malignant neoplasm of breast     Paget's bone disease       No Known Allergies    Current Outpatient Prescriptions   Medication Sig Dispense Refill     CALCIUM-VITAMIN D PO        B Complex Vitamins (VITAMIN B COMPLEX PO)        VITAMIN E COMPLEX PO          Social History   Substance Use Topics     Smoking status: Never Smoker     Smokeless tobacco: Never Used     Alcohol use No       CHRISTIANA García  11/7/2017  12:32 PM       "

## 2017-11-07 NOTE — PATIENT INSTRUCTIONS
Return in one year with a renal US.    It was a pleasure meeting with you today.  Thank you for allowing me and my team the privilege of caring for you today.  YOU are the reason we are here, and I truly hope we provided you with the excellent service you deserve.  Please let us know if there is anything else we can do for you so that we can be sure you are leaving completely satisfied with your care experience.

## 2017-11-07 NOTE — LETTER
11/7/2017       RE: Jenny Cruz  [address]  [place] [address]     Dear Colleague,    Thank you for referring your patient, Jenny Cruz, to the [facility] UROLOGY AND [facility] FOR PROSTATE AND UROLOGIC CANCERS at [facility]. Please see a copy of my visit note below.    REASON FOR VISIT TODAY:  Renal mass.      HISTORY OF PRESENT ILLNESS:  Ms. Cruz is a 72-year-old woman who comes to see us today for renal masses.  The patient apparently was found to have small renal lesions noted on imaging obtained several years ago.  She has undergone interval followup surveillance imaging for these lesions.  On the patient's most recent imaging on 11/03/2017, she was found to have a cyst in the upper pole of the right kidney with a single thin septation but mild peripheral nodularity was noted, but this was not significantly changed in appearance from 12/23/2016.  In addition, on the left kidney, the patient was noted to have a 1.4 x 1.4 x 1.2 cm hyperechoic mass in the lower pole, again also unchanged from 12/23/2016.  It should be of note that the lesion on the right kidney did appear to have enlarged slightly from previous imaging, though again the overall characteristics of the lesion remained similar and MRI was recommended.  The patient recently had her MRI and comes in today in followup.  The patient denies any blood in the urine.  She denies any fevers, chills, sweats, or unexplained weight changes.      PAST MEDICAL HISTORY:  Paget disease and history of breast cancer in 2006.      PAST SURGICAL HISTORY:  Includes a right lumpectomy with subsequent XRT.  She had back surgery in 1982.      MEDICATIONS:  Calcium, vitamin D, B complex and vitamin E.      ALLERGIES:  No known drug allergies.      FAMILY HISTORY:  Negative for kidney cancer, though her mother did have a diagnosis of lung cancer and father CHF.      SOCIAL HISTORY:  Negative for tobacco.  Negative for alcohol.   The patient works as an addiction counselor.      REVIEW OF SYSTEMS:  Negative for fevers, chills, sweats, nausea, vomiting or unexplained weight changes.      PHYSICAL EXAMINATION:  On exam, her blood pressure is 116/69, pulse 52.  She is in no acute distress.      The patient's MRI from 2017 demonstrates a tiny probable angiomyolipoma in the inferior pole of the left kidney and a tiny probable complex cyst in the upper pole of the right kidney.  Both lesions measure just under 1 cm in size.      ASSESSMENT AND PLAN:  Over half of today's 30-minute visit was spent counseling the patient regarding her renal masses.  I suggested to Ms. Shen that the good news is that neither of these lesions are terribly concerning for cancer.  We did discuss that angiomyolipoma does have a risk of bleeding but only after the lesion, however, gets to greater than 4 cm and really closer to 6 cm in size.  We discussed that mildly complex cysts with a single septation or 2 also carry low likelihood of cancer.  Again, no further intervention is required at this time.  We will simply see the patient back in 1 year's time with a renal ultrasound to make sure again that the lesion and in particular the angiomyolipoma does not grow in the future.  Ms. Shen is in agreement with plan.         CECIL GARCIA MD       D: 2017 17:03   T: 2017 14:59   MT: adve      Name:     KIKI SHEN   MRN:      [id number]        Account:      [id number]   :      1945           Service Date: 2017      Document: [id number]       Again, thank you for allowing me to participate in the care of your patient.      Sincerely,    Cecil Garcia MD    cc:   Jong Wray MD   [address], [address]  [address]

## 2017-11-07 NOTE — MR AVS SNAPSHOT
After Visit Summary   11/7/2017    Jenny Cruz    MRN: [id number]           Patient Information     Date Of Birth          1945        Visit Information        Provider Department      11/7/2017 11:30 AM Cecil Garcia MD [facility] Urology and [facility] for Prostate and Urologic Cancers        Today's Diagnoses     Renal mass    -  1      Care Instructions    Return in one year with a renal US.    It was a pleasure meeting with you today.  Thank you for allowing me and my team the privilege of caring for you today.  YOU are the reason we are here, and I truly hope we provided you with the excellent service you deserve.  Please let us know if there is anything else we can do for you so that we can be sure you are leaving completely satisfied with your care experience.                  Follow-ups after your visit        Who to contact     Please call your clinic at [phone] to:    Ask questions about your health    Make or cancel appointments    Discuss your medicines    Learn about your test results    Speak to your doctor   If you have compliments or concerns about an experience at your clinic, or if you wish to file a complaint, please contact [facility] Patient Relations at [phone] or email us at [email or web address]         Additional Information About Your Visit        MyChart Information     [employer] gives you secure access to your electronic health record. If you see a primary care provider, you can also send messages to your care team and make appointments. If you have questions, please call your primary care clinic.  If you do not have a primary care provider, please call [phone] and they will assist you.      linked[employer] is an electronic gateway that provides easy, online access to your medical records. With linkedFA, you can request a clinic appointment, read your test results, renew a prescription or communicate with your care  "team.     To access your existing account, please contact your [facility] or call [phone] for assistance.        Care EveryWhere ID     This is your Care EveryWhere ID. This could be used by other organizations to access your [facility] medical records  [id number]        Your Vitals Were     Pulse Height BMI (Body Mass Index)             52 1.626 m (5' 4\") 27.74 kg/m2          Blood Pressure from Last 3 Encounters:   11/07/17 116/69   09/25/17 120/70   09/22/17 128/70    Weight from Last 3 Encounters:   11/07/17 73.3 kg (161 lb 9.6 oz)   09/25/17 72.4 kg (159 lb 11.2 oz)   09/22/17 72.5 kg (159 lb 12.8 oz)               Primary Care Provider Office Phone # Fax #    Jong Wray [phone]       0 [address] [address]        Equal Access to Services     [facility] Center[facility] : Hadii aad ku hadasho Soomaali, waaxda luqadaha, qaybta kaalmada adeegyada, waxay idiin hayaan adeeg kharahermes la'woodyn . So [facility] [phone].    ATENCIÓN: Si habla español, tiene a melvin disposición servicios gratuitos de asistencia lingüística. Llame al [phone].    We comply with applicable federal civil rights laws and [place] laws. We do not discriminate on the basis of race, color, national origin, age, disability, sex, sexual orientation, or gender identity.            Thank you!     Thank you for choosing [facility] UROLOGY AND [facility] FOR PROSTATE AND UROLOGIC CANCERS  for your care. Our goal is always to provide you with excellent care. Hearing back from our patients is one way we can continue to improve our services. Please take a few minutes to complete the written survey that you may receive in the mail after your visit with us. Thank you!             Your Updated Medication List - Protect others around you: Learn how to safely use, store and throw away your medicines at www.disposemymeds.org.          This list is accurate as of: 11/7/17 11:59 PM.  Always use your " most recent med list.                   Brand Name Dispense Instructions for use Diagnosis    CALCIUM-VITAMIN D PO           VITAMIN B COMPLEX PO           VITAMIN E COMPLEX PO

## 2017-11-08 NOTE — PROGRESS NOTES
REASON FOR VISIT TODAY:  Renal mass.      HISTORY OF PRESENT ILLNESS:  Ms. Cruz is a 72-year-old woman who comes to see us today for renal masses.  The patient apparently was found to have small renal lesions noted on imaging obtained several years ago.  She has undergone interval followup surveillance imaging for these lesions.  On the patient's most recent imaging on 11/03/2017, she was found to have a cyst in the upper pole of the right kidney with a single thin septation but mild peripheral nodularity was noted, but this was not significantly changed in appearance from 12/23/2016.  In addition, on the left kidney, the patient was noted to have a 1.4 x 1.4 x 1.2 cm hyperechoic mass in the lower pole, again also unchanged from 12/23/2016.  It should be of note that the lesion on the right kidney did appear to have enlarged slightly from previous imaging, though again the overall characteristics of the lesion remained similar and MRI was recommended.  The patient recently had her MRI and comes in today in followup.  The patient denies any blood in the urine.  She denies any fevers, chills, sweats, or unexplained weight changes.      PAST MEDICAL HISTORY:  Paget disease and history of breast cancer in 2006.      PAST SURGICAL HISTORY:  Includes a right lumpectomy with subsequent XRT.  She had back surgery in 1982.      MEDICATIONS:  Calcium, vitamin D, B complex and vitamin E.      ALLERGIES:  No known drug allergies.      FAMILY HISTORY:  Negative for kidney cancer, though her mother did have a diagnosis of lung cancer and father CHF.      SOCIAL HISTORY:  Negative for tobacco.  Negative for alcohol.  The patient works as an addiction counselor.      REVIEW OF SYSTEMS:  Negative for fevers, chills, sweats, nausea, vomiting or unexplained weight changes.      PHYSICAL EXAMINATION:  On exam, her blood pressure is 116/69, pulse 52.  She is in no acute distress.      The patient's MRI from 11/06/2017 demonstrates a  tiny probable angiomyolipoma in the inferior pole of the left kidney and a tiny probable complex cyst in the upper pole of the right kidney.  Both lesions measure just under 1 cm in size.      ASSESSMENT AND PLAN:  Over half of today's 30-minute visit was spent counseling the patient regarding her renal masses.  I suggested to Ms. Shen that the good news is that neither of these lesions are terribly concerning for cancer.  We did discuss that angiomyolipoma does have a risk of bleeding but only after the lesion, however, gets to greater than 4 cm and really closer to 6 cm in size.  We discussed that mildly complex cysts with a single septation or 2 also carry low likelihood of cancer.  Again, no further intervention is required at this time.  We will simply see the patient back in 1 year's time with a renal ultrasound to make sure again that the lesion and in particular the angiomyolipoma does not grow in the future.  Ms. Shen is in agreement with plan.      cc:   Jong Wray MD   [address]  [address]         JASON ADKINS MD             D: 2017 17:03   T: 2017 14:59   MT: dave      Name:     KIKI SHEN   MRN:      [id number]        Account:      [id number]   :      1945           Service Date: 2017      Document: [id number]

## 2018-08-06 ENCOUNTER — RADIANT APPOINTMENT (OUTPATIENT)
Dept: MAMMOGRAPHY | Facility: CLINIC | Age: 73
End: 2018-08-06
Attending: INTERNAL MEDICINE
Payer: COMMERCIAL

## 2018-08-06 DIAGNOSIS — Z12.31 VISIT FOR SCREENING MAMMOGRAM: ICD-10-CM

## 2019-09-30 ENCOUNTER — HEALTH MAINTENANCE LETTER (OUTPATIENT)
Age: 74
End: 2019-09-30

## 2020-03-15 ENCOUNTER — HEALTH MAINTENANCE LETTER (OUTPATIENT)
Age: 75
End: 2020-03-15

## 2021-01-15 ENCOUNTER — HEALTH MAINTENANCE LETTER (OUTPATIENT)
Age: 76
End: 2021-01-15

## 2021-05-09 ENCOUNTER — HEALTH MAINTENANCE LETTER (OUTPATIENT)
Age: 76
End: 2021-05-09

## 2021-10-24 ENCOUNTER — HEALTH MAINTENANCE LETTER (OUTPATIENT)
Age: 76
End: 2021-10-24

## 2022-06-05 ENCOUNTER — HEALTH MAINTENANCE LETTER (OUTPATIENT)
Age: 77
End: 2022-06-05

## 2022-10-15 ENCOUNTER — HEALTH MAINTENANCE LETTER (OUTPATIENT)
Age: 77
End: 2022-10-15

## 2023-06-11 ENCOUNTER — HEALTH MAINTENANCE LETTER (OUTPATIENT)
Age: 78
End: 2023-06-11